# Patient Record
Sex: FEMALE | Race: WHITE | NOT HISPANIC OR LATINO | Employment: UNEMPLOYED | RURAL
[De-identification: names, ages, dates, MRNs, and addresses within clinical notes are randomized per-mention and may not be internally consistent; named-entity substitution may affect disease eponyms.]

---

## 2020-11-25 ENCOUNTER — HISTORICAL (OUTPATIENT)
Dept: ADMINISTRATIVE | Facility: HOSPITAL | Age: 55
End: 2020-11-25

## 2022-02-15 ENCOUNTER — OFFICE VISIT (OUTPATIENT)
Dept: DERMATOLOGY | Facility: CLINIC | Age: 57
End: 2022-02-15

## 2022-02-15 VITALS — BODY MASS INDEX: 34.07 KG/M2 | WEIGHT: 224.81 LBS | RESPIRATION RATE: 18 BRPM | HEIGHT: 68 IN

## 2022-02-15 DIAGNOSIS — L82.1 SEBORRHEIC KERATOSES: ICD-10-CM

## 2022-02-15 DIAGNOSIS — L57.8 OTHER SKIN CHANGES DUE TO CHRONIC EXPOSURE TO NONIONIZING RADIATION: Primary | ICD-10-CM

## 2022-02-15 DIAGNOSIS — D22.9 MULTIPLE BENIGN NEVI: ICD-10-CM

## 2022-02-15 PROCEDURE — 99203 OFFICE O/P NEW LOW 30 MIN: CPT | Mod: ,,, | Performed by: DERMATOLOGY

## 2022-02-15 PROCEDURE — 99203 PR OFFICE/OUTPT VISIT, NEW, LEVL III, 30-44 MIN: ICD-10-PCS | Mod: ,,, | Performed by: DERMATOLOGY

## 2022-02-15 NOTE — PROGRESS NOTES
[facility]   Ria Krishnamurthy MD    Patient Name: Jazmin Ruiz  Patient YOB: 1965   Date of Service: 2/15/22    CC: Full Skin Exam    HPI: Jazmin Ruiz is a 57 y.o. female presents today for a full skin exam.  Patient has not been seen by a dermatologist in the past and dermatologic history includes no hx of skin cancer. Patient is concerned today about a mole located on the groin.  It has been present for 1 year(s). It has not been treated in the past.     Patient is also concerned today about lesion located on the right and left flank. States lesion is new and irregular.    Past Medical History:   Diagnosis Date    HTN (hypertension)     Seizures      Past Surgical History:   Procedure Laterality Date    HYSTERECTOMY       Review of patient's allergies indicates:  No Known Allergies  No current outpatient medications on file.    ROS: A focused review of systems was obtained and negative.     Exam: A full skin exam was performed including scalp, hair, face, neck, chest, back, abdomen, right arm, left arm, right hand, left hand, nails, right leg, and left leg.  All areas examined were normal expect as per below in assessment and plan.  General Appearance of the patient is well developed and well nourished.  Orientation: alert and oriented x 3.  Mood and affect: pleasant.    Assessment:   The primary encounter diagnosis was Other skin changes due to chronic exposure to nonionizing radiation. Diagnoses of Multiple benign nevi and Seborrheic keratoses were also pertinent to this visit.    Plan:  Other Skin Changes Due to Chronic Exposure of Nonionizing Radiation (L57.8)    Plan: Monitoring.     Plan: Sunscreen Recommendations.  I recommended a broad spectrum sunscreen with a SPF of 30 or higher. I explained that SPF 30 sunscreens block approximately 97 percent of the  sun's harmful rays. Sunscreens should be applied at least 15 minutes prior to expected sun exposure and then every  2 hours after that as long as  sun exposure continues. If swimming or exercising sunscreen should be reapplied every 45 minutes to an hour after getting wet or sweating. One  ounce, or the equivalent of a shot glass full of sunscreen, is adequate to protect the skin not covered by a bathing suit. I also recommended a lip  balm with a sunscreen as well. Sun protective clothing can be used in lieu of sunscreen but must be worn the entire time you are exposed to the  sun's rays.    Seborrheic Keratosis (L82.1)  - Stuck-on, warty, greasy brown papule with pseudo-horn cysts scattered on the trunk and extremities    Plan: Counseling.  I counseled the patient regarding the following:  Skin Care: Seborrheic Keratoses are benign. No treatment is necessary.  Expectations: Seborrheic Keratoses are benign warty growths. Patients get more of them as they age    Plan: Reassurance    Benign Nevus (D22.72)  - Dome shaped regular papule    Plan: Reassurance.    Plan: Counseling.  I counseled the patient regarding the following:  Instructions: Monthly self-skin checks to monitor for any changes in moles are recommended.  Expectations: Benign Nevi are pigmented nests of cells within the skin. No treatment is necessary.  Contact Office if: Any moles change in size, shape or color; itch, burn or bleed.            Follow up if symptoms worsen or fail to improve.    Ria Krishnamurthy MD

## 2023-10-18 ENCOUNTER — OFFICE VISIT (OUTPATIENT)
Dept: OBSTETRICS AND GYNECOLOGY | Facility: CLINIC | Age: 58
End: 2023-10-18

## 2023-10-18 ENCOUNTER — HOSPITAL ENCOUNTER (OUTPATIENT)
Dept: RADIOLOGY | Facility: HOSPITAL | Age: 58
Discharge: HOME | End: 2023-10-18
Attending: OBSTETRICS & GYNECOLOGY

## 2023-10-18 VITALS
RESPIRATION RATE: 18 BRPM | BODY MASS INDEX: 35.52 KG/M2 | OXYGEN SATURATION: 99 % | SYSTOLIC BLOOD PRESSURE: 148 MMHG | DIASTOLIC BLOOD PRESSURE: 99 MMHG | TEMPERATURE: 99 F | HEIGHT: 68 IN | WEIGHT: 234.38 LBS | HEART RATE: 100 BPM

## 2023-10-18 DIAGNOSIS — Z87.19 HISTORY OF BLOODY STOOLS: ICD-10-CM

## 2023-10-18 DIAGNOSIS — N94.19 DYSPAREUNIA DUE TO MEDICAL CONDITION IN FEMALE: ICD-10-CM

## 2023-10-18 DIAGNOSIS — Z01.419 ENCOUNTER FOR ANNUAL ROUTINE GYNECOLOGICAL EXAMINATION: ICD-10-CM

## 2023-10-18 DIAGNOSIS — R10.9 ABDOMINAL BLOATING WITH CRAMPS: ICD-10-CM

## 2023-10-18 DIAGNOSIS — N81.6 BADEN-WALKER GRADE 1 RECTOCELE: ICD-10-CM

## 2023-10-18 DIAGNOSIS — E78.5 ELEVATED LIPIDS: ICD-10-CM

## 2023-10-18 DIAGNOSIS — I10 HYPERTENSION, UNSPECIFIED TYPE: ICD-10-CM

## 2023-10-18 DIAGNOSIS — M54.50 LOW BACK PAIN WITHOUT SCIATICA, UNSPECIFIED BACK PAIN LATERALITY, UNSPECIFIED CHRONICITY: ICD-10-CM

## 2023-10-18 DIAGNOSIS — M25.551 RIGHT HIP PAIN: ICD-10-CM

## 2023-10-18 DIAGNOSIS — E55.9 VITAMIN D DEFICIENCY: ICD-10-CM

## 2023-10-18 DIAGNOSIS — M85.80 OSTEOPENIA, UNSPECIFIED LOCATION: ICD-10-CM

## 2023-10-18 DIAGNOSIS — N25.9 IMPAIRED RENAL FUNCTION DISORDER: ICD-10-CM

## 2023-10-18 DIAGNOSIS — E66.9 OBESITY (BMI 35.0-39.9 WITHOUT COMORBIDITY): ICD-10-CM

## 2023-10-18 DIAGNOSIS — R14.0 ABDOMINAL BLOATING WITH CRAMPS: ICD-10-CM

## 2023-10-18 DIAGNOSIS — M81.0 OSTEOPOROSIS, UNSPECIFIED OSTEOPOROSIS TYPE, UNSPECIFIED PATHOLOGICAL FRACTURE PRESENCE: ICD-10-CM

## 2023-10-18 DIAGNOSIS — R77.8 ELEVATED TOTAL PROTEIN: ICD-10-CM

## 2023-10-18 DIAGNOSIS — Z78.0 MENOPAUSE: Primary | ICD-10-CM

## 2023-10-18 DIAGNOSIS — N30.90 CYSTITIS: ICD-10-CM

## 2023-10-18 DIAGNOSIS — N95.2 VAGINITIS, ATROPHIC: ICD-10-CM

## 2023-10-18 LAB
BACTERIA #/AREA URNS HPF: ABNORMAL /HPF
BILIRUB UR QL STRIP: NEGATIVE
CLARITY UR: ABNORMAL
COLOR UR: YELLOW
CTP QC/QA: YES
FECAL OCCULT BLOOD, POC: NEGATIVE
GLUCOSE UR STRIP-MCNC: NORMAL MG/DL
HYALINE CASTS #/AREA URNS LPF: ABNORMAL /LPF
KETONES UR STRIP-SCNC: NEGATIVE MG/DL
LEUKOCYTE ESTERASE UR QL STRIP: ABNORMAL
MUCOUS, UA: ABNORMAL /LPF
NITRITE UR QL STRIP: POSITIVE
PH UR STRIP: 6 PH UNITS
PROT UR QL STRIP: 10
RBC # UR STRIP: NEGATIVE /UL
RBC #/AREA URNS HPF: 2 /HPF
SP GR UR STRIP: 1.02
SQUAMOUS #/AREA URNS LPF: ABNORMAL /HPF
UROBILINOGEN UR STRIP-ACNC: NORMAL MG/DL
WBC #/AREA URNS HPF: 5 /HPF

## 2023-10-18 PROCEDURE — 82270 OCCULT BLOOD FECES: CPT | Mod: PBBFAC,91 | Performed by: OBSTETRICS & GYNECOLOGY

## 2023-10-18 PROCEDURE — 87077 CULTURE, URINE: ICD-10-PCS | Mod: ,,, | Performed by: CLINICAL MEDICAL LABORATORY

## 2023-10-18 PROCEDURE — 87624 HUMAN PAPILLOMAVIRUS (HPV): ICD-10-PCS | Mod: ,,, | Performed by: CLINICAL MEDICAL LABORATORY

## 2023-10-18 PROCEDURE — 99999PBSHW POCT OCCULT BLOOD STOOL: Mod: PBBFAC,,,

## 2023-10-18 PROCEDURE — 87186 CULTURE, URINE: ICD-10-PCS | Mod: ,,, | Performed by: CLINICAL MEDICAL LABORATORY

## 2023-10-18 PROCEDURE — 87186 SC STD MICRODIL/AGAR DIL: CPT | Mod: ,,, | Performed by: CLINICAL MEDICAL LABORATORY

## 2023-10-18 PROCEDURE — 99999PBSHW POCT OCCULT BLOOD STOOL: ICD-10-PCS | Mod: PBBFAC,,,

## 2023-10-18 PROCEDURE — 72100 XR LUMBAR SPINE AP AND LATERAL: ICD-10-PCS | Mod: 26,,, | Performed by: RADIOLOGY

## 2023-10-18 PROCEDURE — 99999PBSHW PR PBB SHADOW TECHNICAL ONLY FILED TO HB: Mod: PBBFAC,,,

## 2023-10-18 PROCEDURE — 99215 OFFICE O/P EST HI 40 MIN: CPT | Mod: PBBFAC | Performed by: OBSTETRICS & GYNECOLOGY

## 2023-10-18 PROCEDURE — 87086 URINE CULTURE/COLONY COUNT: CPT | Mod: ,,, | Performed by: CLINICAL MEDICAL LABORATORY

## 2023-10-18 PROCEDURE — 99396 PR PREVENTIVE VISIT,EST,40-64: ICD-10-PCS | Mod: S$PBB,,, | Performed by: OBSTETRICS & GYNECOLOGY

## 2023-10-18 PROCEDURE — 81001 URINALYSIS: ICD-10-PCS | Mod: ,,, | Performed by: CLINICAL MEDICAL LABORATORY

## 2023-10-18 PROCEDURE — 82270 OCCULT BLOOD FECES: CPT | Mod: PBBFAC | Performed by: OBSTETRICS & GYNECOLOGY

## 2023-10-18 PROCEDURE — 99396 PREV VISIT EST AGE 40-64: CPT | Mod: S$PBB,,, | Performed by: OBSTETRICS & GYNECOLOGY

## 2023-10-18 PROCEDURE — 73502 X-RAY EXAM HIP UNI 2-3 VIEWS: CPT | Mod: TC,RT

## 2023-10-18 PROCEDURE — 72100 X-RAY EXAM L-S SPINE 2/3 VWS: CPT | Mod: 26,,, | Performed by: RADIOLOGY

## 2023-10-18 PROCEDURE — 87624 HPV HI-RISK TYP POOLED RSLT: CPT | Mod: ,,, | Performed by: CLINICAL MEDICAL LABORATORY

## 2023-10-18 PROCEDURE — 87077 CULTURE AEROBIC IDENTIFY: CPT | Mod: ,,, | Performed by: CLINICAL MEDICAL LABORATORY

## 2023-10-18 PROCEDURE — 87086 CULTURE, URINE: ICD-10-PCS | Mod: ,,, | Performed by: CLINICAL MEDICAL LABORATORY

## 2023-10-18 PROCEDURE — 72100 X-RAY EXAM L-S SPINE 2/3 VWS: CPT | Mod: TC

## 2023-10-18 PROCEDURE — 73502 XR HIP WITH PELVIS WHEN PERFORMED, 2 OR 3  VIEWS RIGHT: ICD-10-PCS | Mod: 26,RT,, | Performed by: RADIOLOGY

## 2023-10-18 PROCEDURE — 87591 N.GONORRHOEAE DNA AMP PROB: CPT | Mod: ,,, | Performed by: CLINICAL MEDICAL LABORATORY

## 2023-10-18 PROCEDURE — 88142 CYTOPATH C/V THIN LAYER: CPT | Mod: TC,GCY | Performed by: OBSTETRICS & GYNECOLOGY

## 2023-10-18 PROCEDURE — 81001 URINALYSIS AUTO W/SCOPE: CPT | Mod: ,,, | Performed by: CLINICAL MEDICAL LABORATORY

## 2023-10-18 PROCEDURE — 87491 CHLAMYDIA/GC, PCR (THINPREP): ICD-10-PCS | Mod: ,,, | Performed by: CLINICAL MEDICAL LABORATORY

## 2023-10-18 PROCEDURE — 73502 X-RAY EXAM HIP UNI 2-3 VIEWS: CPT | Mod: 26,RT,, | Performed by: RADIOLOGY

## 2023-10-18 PROCEDURE — 87591 CHLAMYDIA/GC, PCR (THINPREP): ICD-10-PCS | Mod: ,,, | Performed by: CLINICAL MEDICAL LABORATORY

## 2023-10-18 PROCEDURE — 87491 CHLMYD TRACH DNA AMP PROBE: CPT | Mod: ,,, | Performed by: CLINICAL MEDICAL LABORATORY

## 2023-10-18 RX ORDER — ESTRADIOL 0.1 MG/G
1 CREAM VAGINAL
Qty: 42.5 G | Refills: 1 | Status: SHIPPED | OUTPATIENT
Start: 2023-10-19 | End: 2024-10-18

## 2023-10-18 RX ORDER — ESTRADIOL 0.1 MG/D
1 PATCH TRANSDERMAL
Qty: 12 PATCH | Refills: 3 | Status: SHIPPED | OUTPATIENT
Start: 2023-10-18 | End: 2023-11-15

## 2023-10-18 NOTE — PATIENT INSTRUCTIONS
Dr. Hernan Arshad thanks you for this office visit at [facility].    Diagnosis for this visit:   Problem List Items Addressed This Visit    None  Visit Diagnoses       History of bloody stools    -  Primary    Relevant Orders    Ambulatory referral/consult to Gastroenterology    Vaginitis and vulvovaginitis        Encounter for annual routine gynecological examination        Hypertension, unspecified type                 The Plan: Screening labs; vitamin-D check; hemoglobin A1c; gonadotropins and estradiol level; thin prep Pap; hemoccult screen was negative today             Discuss rectocele             Referral to GI Medicine             Dr. Arshad recommends transdermal estrogen and also recommends estradiol vaginal cream to treat the symptoms associated atrophic vaginitis as well as burning or dysuria with urination when the urine hits the outside skin around the vagina.            Dr. Arshad does recommend a bone density study since she is been on no estrogen replacement therapy and he does also recommend a thoracic and lumbar x-rays as well as right hip x-ray.            Dr. Arshad recommends vitamin D3 over-the-counter at 4000 units daily.      Please practice best food and exercise habits for your age.    Dr. Hernan Arshad recommends avoidance of smoking and illicit medications or habits.    Please call  or schedule for any change in your health.     Please keep the next scheduled appointment or call for any need to change the appointment.     Dr. Hernan Arshad recommends yearly exams for good health maintenance.      Thank you    Dr. Hernan Arshad  10/18/2023 12:16 PM

## 2023-10-18 NOTE — PROGRESS NOTES
Jazmin Ruiz female  for   Chief Complaint   Patient presents with    Annual Exam     As per lala she is here for an annual exam and stated that she is having blood in her stool,urine and in her vagina as well as well as a strong odor in her stool and mucus as well. But she denied having any UTI's and is bloated as well. Eating is difficult as she has fullness after a few bites.       OB History          5    Para   3    Term                AB   2    Living             SAB        IAB        Ectopic        Multiple        Live Births                      PHI: Patient presents to our office for an annual exam with several related problems.  The patient does not use estrogen replacement therapy but she has had in the past a abdominal hysterectomy with BSO.  Patient is not sexually active due to severe dyspareunia secondary to dryness.     Patient relates she has to splint for bowel movements.  Also relates that with eating habits she has epigastric and abdominal pain.  She also has change in her bowel habits with more mucus and blood in her stools.  She does complain of intermittent hematuria.  She is complain also some vaginal bleeding.     Patient does complain of occasional clear discharge from both nipples.     Patient does relate she has chronic low back pain in pain when she ambulates that limits her ability to walk.  She has right hip pain as well.     History reviewed. No pertinent past medical history.         Past Surgical History:   Procedure Laterality Date    TOTAL ABDOMINAL HYSTERECTOMY W/ BILATERAL SALPINGOOPHORECTOMY         Dr. Mark Medina        Review of patient's allergies indicates:  No Known Allergies      ROS:Pertinent items are noted in HPI.     Physical exam:     Blood pressure:  148/99; height 5 ft 8 in; weight:  234 lb in 6.4 oz; pulse rate: 100     General Appearance: healthy, alert, no distress, smiling     HEENT: normal exam     Lymphatic: no palpable  lymphadenopathy, no hepatosplenomegaly     Chest:         Breasts:No dimpling, nipple retraction or discharge. No masses or nodes., Normal to inspection, and Normal breast tissue bilaterally; left nipple is partially inverted         Lungs: clear to auscultation bilaterally and normal percussion bilaterally         Heart: regular rate and rhythm, S1, S2 normal, no murmur, click, rub or gallop     Abdomen:soft, non-tender, without masses or organomegaly, normal bowel sounds, without guarding, without rebound, and hypogastric midline incision without any evidence of ventral incisional hernia; patient has no ascites and there is no evidence of any abdominal bruit.  There is a moderate abdominal panniculus.     Pelvic:             Vulva:Bartholin's, Urethra, [place] normal, no lesions, normal female hair distribution, no clitoral enlargement, vulva unremarkable except there is contracture the vaginal introitus with 2 fingers  noted laterally on exam does produce discomfort.            Vagina:  There is no cystocele, no urethrocele no vault prolapse but there is a rectocele present ;vaginal walls are tender.            Uterus: surgically absent uterus and cervix            Adnexae: surgically absent     Rectal:  Exam does confirm the rectocele present; there is no blood noted; on examining the rectum stool guaiac negative     Extremity: normal, extremities warm, no clubbing, no cyanosis, no edema, non-tender; there is no CVA tenderness     Skin: normal exam           Assessment:   Problem List Items Addressed This Visit    None  Visit Diagnoses         Menopause syndrome    -  Primary     Relevant Orders     ThinPrep Pap Test     CBC Auto Differential     Comprehensive Metabolic Panel     Vitamin D     Thyroid Panel     Urinalysis     Sedimentation Rate     Hemoglobin A1C     Urine culture     [place]ovis-Walker grade 1 rectocele         Relevant Orders     ThinPrep Pap Test     CBC Auto Differential     Comprehensive Metabolic  Panel     Vitamin D     Thyroid Panel     Urinalysis     Sedimentation Rate     Hemoglobin A1C     Urine culture     History of bloody stools         Relevant Orders     ThinPrep Pap Test     CBC Auto Differential     Comprehensive Metabolic Panel     Vitamin D     Thyroid Panel     Urinalysis     Sedimentation Rate     Hemoglobin A1C     Urine culture     Dyspareunia due to medical condition in female         Relevant Orders     ThinPrep Pap Test     CBC Auto Differential     Comprehensive Metabolic Panel     Vitamin D     Thyroid Panel     Urinalysis     Sedimentation Rate     Hemoglobin A1C     Urine culture     Encounter for annual routine gynecological examination         Relevant Orders     ThinPrep Pap Test     CBC Auto Differential     Comprehensive Metabolic Panel     Vitamin D     Thyroid Panel     Urinalysis     Sedimentation Rate     Hemoglobin A1C     Urine culture     Vitamin D deficiency         Relevant Orders     ThinPrep Pap Test     CBC Auto Differential     Comprehensive Metabolic Panel     Vitamin D     Thyroid Panel     Urinalysis     Sedimentation Rate     Hemoglobin A1C     Urine culture     Osteopenia, unspecified location         Relevant Orders     ThinPrep Pap Test     CBC Auto Differential     Comprehensive Metabolic Panel     Vitamin D     Thyroid Panel     Urinalysis     Sedimentation Rate     Hemoglobin A1C     Urine culture     Low back pain without sciatica, unspecified back pain laterality, unspecified chronicity         Relevant Orders     ThinPrep Pap Test     CBC Auto Differential     Comprehensive Metabolic Panel     Vitamin D     Thyroid Panel     Urinalysis     Sedimentation Rate     Hemoglobin A1C     Urine culture       Hypertension         Plan:  Screening labs; vitamin-D check; hemoglobin A1c; gonadotropins and estradiol level; thin prep Pap; hemoccult screen was negative today             Discuss rectocele             Referral to GI Medicine             Dr. Arshad  recommends transdermal estrogen and also recommends estradiol vaginal cream to treat the symptoms associated atrophic vaginitis as well as burning or dysuria with urination when the urine hits the outside skin around the vagina.            Dr. Arshad does recommend a bone density study since she is been on no estrogen replacement therapy and he does also recommend a thoracic and lumbar x-rays as well as right hip x-ray.            Dr. Arshad recommends vitamin D3 over-the-counter at 4000 units daily.              Addendum on 10/19/2023:  Patient was found have elevated cholesterol and lipids.  Please refer to lab for detail; patient's vitamin-D level is low at 18 ng/mL.  Patient does have an elevated gammaglobulin as well as total proteins.  She does need a protein electrophoresis.  She will need a repeat renal function study in the future.             Addendum on 10/20/2023:  Patient does have greater than 100,000 colony count of E coli.  Macrobid is an appropriate antibiotic and this will be sent to her pharmacy.           Addendum on 01/01/2023:  DEXA study indicates osteoporosis-relatively severe.  Patient is a candidate for Prolia.    DEXA report:  EXAMINATION:  Bone density study     CLINICAL HISTORY:  Other specified disorders of bone density and structure, unspecified site     TECHNIQUE:  Bone densitometry performed with evaluation of the lumbar spine and left hip.     COMPARISON:  None     FINDINGS:  L2-L4:     BMD (g/cm2): 0.844     T score: -2.7     Z score: -1.3     Bone mineral density: Osteoporosis     Left total femur:     BMD (g/cm2): 0.653     T score: -2.4     Z score: -1.5     Bone mineral density: Osteopenia     Left femoral neck:     BMD (g/cm2): 0.543     T score: -2.8     Z score: -1.5     Bone mineral density: Osteoporosis

## 2023-10-19 PROBLEM — R77.8 ELEVATED TOTAL PROTEIN: Status: ACTIVE | Noted: 2023-10-19

## 2023-10-19 PROBLEM — N25.9 IMPAIRED RENAL FUNCTION DISORDER: Status: ACTIVE | Noted: 2023-10-19

## 2023-10-20 LAB
GH SERPL-MCNC: NORMAL NG/ML
INSULIN SERPL-ACNC: NORMAL U[IU]/ML
LAB AP CLINICAL INFORMATION: NORMAL
LAB AP GYN INTERPRETATION: NEGATIVE
LAB AP PAP DISCLAIMER COMMENTS: NORMAL
RENIN PLAS-CCNC: NORMAL NG/ML/H
UA COMPLETE W REFLEX CULTURE PNL UR: ABNORMAL

## 2023-10-20 RX ORDER — NITROFURANTOIN 25; 75 MG/1; MG/1
100 CAPSULE ORAL 2 TIMES DAILY
Qty: 20 CAPSULE | Refills: 0 | Status: SHIPPED | OUTPATIENT
Start: 2023-10-20 | End: 2023-10-30

## 2023-10-24 LAB
CHLAMYDIA BY PCR: NEGATIVE
HPV 16: NEGATIVE
HPV 18: NEGATIVE
HPV OTHER: NEGATIVE
N. GONORRHOEAE (GC) BY PCR: NEGATIVE

## 2023-11-01 ENCOUNTER — HOSPITAL ENCOUNTER (OUTPATIENT)
Dept: RADIOLOGY | Facility: HOSPITAL | Age: 58
Discharge: HOME | End: 2023-11-01
Attending: OBSTETRICS & GYNECOLOGY

## 2023-11-01 DIAGNOSIS — M85.80 OSTEOPENIA, UNSPECIFIED LOCATION: ICD-10-CM

## 2023-11-01 PROCEDURE — 77080 DXA BONE DENSITY AXIAL: CPT | Mod: TC

## 2023-11-01 PROCEDURE — 77080 DXA BONE DENSITY AXIAL SKELETON 1 OR MORE SITES: ICD-10-PCS | Mod: 26,,, | Performed by: RADIOLOGY

## 2023-11-01 PROCEDURE — 77080 DXA BONE DENSITY AXIAL: CPT | Mod: 26,,, | Performed by: RADIOLOGY

## 2023-11-15 ENCOUNTER — PATIENT MESSAGE (OUTPATIENT)
Dept: OBSTETRICS AND GYNECOLOGY | Facility: CLINIC | Age: 58
End: 2023-11-15

## 2023-11-15 ENCOUNTER — OFFICE VISIT (OUTPATIENT)
Dept: OBSTETRICS AND GYNECOLOGY | Facility: CLINIC | Age: 58
End: 2023-11-15

## 2023-11-15 VITALS
BODY MASS INDEX: 36.07 KG/M2 | SYSTOLIC BLOOD PRESSURE: 145 MMHG | RESPIRATION RATE: 18 BRPM | HEART RATE: 90 BPM | OXYGEN SATURATION: 97 % | HEIGHT: 68 IN | DIASTOLIC BLOOD PRESSURE: 78 MMHG | WEIGHT: 238 LBS

## 2023-11-15 DIAGNOSIS — E89.41 HOT FLASHES DUE TO SURGICAL MENOPAUSE: ICD-10-CM

## 2023-11-15 DIAGNOSIS — N25.9 IMPAIRED RENAL FUNCTION DISORDER: ICD-10-CM

## 2023-11-15 DIAGNOSIS — M54.9 OTHER CHRONIC BACK PAIN: ICD-10-CM

## 2023-11-15 DIAGNOSIS — Z12.31 SCREENING MAMMOGRAM, ENCOUNTER FOR: ICD-10-CM

## 2023-11-15 DIAGNOSIS — G89.29 OTHER CHRONIC BACK PAIN: ICD-10-CM

## 2023-11-15 DIAGNOSIS — M81.6 LOCALIZED OSTEOPOROSIS WITHOUT CURRENT PATHOLOGICAL FRACTURE: ICD-10-CM

## 2023-11-15 DIAGNOSIS — R77.8 ELEVATED TOTAL PROTEIN: Primary | ICD-10-CM

## 2023-11-15 DIAGNOSIS — E66.9 OBESITY (BMI 35.0-39.9 WITHOUT COMORBIDITY): ICD-10-CM

## 2023-11-15 DIAGNOSIS — M47.9 DEGENERATIVE JOINT DISEASE OF LOW BACK: ICD-10-CM

## 2023-11-15 LAB
BILIRUB UR QL STRIP: NEGATIVE
CLARITY UR: CLEAR
COLOR UR: ABNORMAL
GLUCOSE UR STRIP-MCNC: NORMAL MG/DL
KETONES UR STRIP-SCNC: NEGATIVE MG/DL
LEUKOCYTE ESTERASE UR QL STRIP: NEGATIVE
NITRITE UR QL STRIP: NEGATIVE
PH UR STRIP: 5.5 PH UNITS
PROT UR QL STRIP: NEGATIVE
RBC # UR STRIP: ABNORMAL /UL
RBC #/AREA URNS HPF: 1 /HPF
SP GR UR STRIP: 1.02
SQUAMOUS #/AREA URNS LPF: ABNORMAL /HPF
UROBILINOGEN UR STRIP-ACNC: NORMAL MG/DL
WBC #/AREA URNS HPF: <1 /HPF

## 2023-11-15 PROCEDURE — 81001 URINALYSIS: ICD-10-PCS | Mod: ,,, | Performed by: CLINICAL MEDICAL LABORATORY

## 2023-11-15 PROCEDURE — 87086 URINE CULTURE/COLONY COUNT: CPT | Mod: ,,, | Performed by: CLINICAL MEDICAL LABORATORY

## 2023-11-15 PROCEDURE — 81001 URINALYSIS AUTO W/SCOPE: CPT | Mod: ,,, | Performed by: CLINICAL MEDICAL LABORATORY

## 2023-11-15 PROCEDURE — 99214 OFFICE O/P EST MOD 30 MIN: CPT | Mod: PBBFAC | Performed by: OBSTETRICS & GYNECOLOGY

## 2023-11-15 PROCEDURE — 99214 OFFICE O/P EST MOD 30 MIN: CPT | Mod: S$PBB,,, | Performed by: OBSTETRICS & GYNECOLOGY

## 2023-11-15 PROCEDURE — 87086 CULTURE, URINE: ICD-10-PCS | Mod: ,,, | Performed by: CLINICAL MEDICAL LABORATORY

## 2023-11-15 PROCEDURE — 99214 PR OFFICE/OUTPT VISIT, EST, LEVL IV, 30-39 MIN: ICD-10-PCS | Mod: S$PBB,,, | Performed by: OBSTETRICS & GYNECOLOGY

## 2023-11-15 RX ORDER — ESTRADIOL 1 MG/1
1 TABLET ORAL DAILY
Qty: 30 TABLET | Refills: 11 | Status: SHIPPED | OUTPATIENT
Start: 2023-11-15 | End: 2024-11-14

## 2023-11-15 RX ORDER — ALENDRONATE SODIUM 70 MG/1
70 TABLET ORAL
Qty: 4 TABLET | Refills: 11 | Status: SHIPPED | OUTPATIENT
Start: 2023-11-15 | End: 2024-11-14

## 2023-11-15 NOTE — PATIENT INSTRUCTIONS
Dr. Hernan Arshad thanks you for this office visit at [facility].    Diagnosis for this visit:   Problem List Items Addressed This Visit          Renal/    Impaired renal function disorder       Other    Elevated total protein     Other Visit Diagnoses       Hot flashes due to surgical menopause    -  Primary    Other chronic back pain        Degenerative joint disease of low back        Localized osteoporosis without current pathological fracture        Obesity (BMI 35.0-39.9 without comorbidity)                 The Plan: :  Alendronate to treat osteoporosis since from economic standpoint Prolia is not available; switch to estradiol 1 mg daily and discontinue transdermal estrogen at cause skin irritation; use topical vaginal estrogen to help treat atrophic vaginitis            Referral to spinal orthopedic surgery for evaluation of her chronic back pain and recommendation for physical therapy; recommend referral to weight loss center for weight loss and diet control; physical therapy for back strengthening          Referral GI Medicine regards elevated alkaline phosphatase and hepatic steatosis as well as change in bowel habits         Repeat renal function studies in about 3 months.  She may need referral to nephrology after that study.         Return to see Dr. Arshad in 3 months' time         Urinalysis and urine culture today        Please practice best food and exercise habits for your age.    Dr. Hernan Arhsad recommends avoidance of smoking and illicit medications or habits.    Please call  or schedule for any change in your health.     Please keep the next scheduled appointment or call for any need to change the appointment.     Dr. Hernan Arshad recommends yearly exams for good health maintenance.      Thank you    Dr. Hernan Arshad  11/15/2023 2:00 PM

## 2023-11-15 NOTE — PROGRESS NOTES
"Jazmin Ruiz female  for   Chief Complaint   Patient presents with    Follow-up     Patient is here for a 4 week follow up , patient did state she had some trouble with the Estrace patches, they caused an allergic reaction and patient did not end up using the cream either           PHI:  Patient returns for follow-up in view of labs.  Her labs were reviewed.  The patient has normal hemoglobin A1c.  She does have an elevated alkaline phosphatase.  Chart review indicates that in 2020 she did have a abdominal ultrasound that revealed hepatic steatosis.  Patient's other labs are within normal limits a decreased EGFR and an increased creatinine.            Patient did use the Macrobid for the cystitis    Patient states she can not tolerate transdermal estrogen-the skin was irritated with the use the transdermal estrogen.  As a consequence she was afraid to use a topical vaginal estrogen.    DEXA scan has revealed that she has significant osteoporosis of both the lumbar spine and left femoral neck and a T-score of-2.8.  Back x-rays revealed degenerative widespread disease with some disc loss.  She states she has significant back pain and can not walk any significant distance because of the low back pain.    Patient was found have a rectocele.    Past Medical History:   Diagnosis Date    HTN (hypertension)     Seizures       Past Surgical History:   Procedure Laterality Date    HYSTERECTOMY        Review of patient's allergies indicates:  No Known Allergies     ROS:Pertinent items are noted in HPI.    Physical exam:    BP (!) 145/78 (BP Location: Left arm, Patient Position: Sitting)   Pulse 90   Resp 18   Ht 5' 8" (1.727 m)   Wt 108 kg (238 lb)   LMP  (LMP Unknown)   SpO2 97%   BMI 36.19 kg/m²      General Appearance: alert, smiling    Chest:           Lungs: clear to auscultation bilaterally           Heart: regular rate and rhythm, S1, S2 normal, no murmur, click, rub or gallop    Abdomen:not " performed    Pelvic: not performed     Extremity: normal    Skin: normal exam        Assessment:   Problem List Items Addressed This Visit    None       Plan:  Alendronate to treat osteoporosis since from economic standpoint Prolia is not available; switch to estradiol 1 mg daily and discontinue transdermal estrogen at cause skin irritation; use topical vaginal estrogen to help treat atrophic vaginitis            Referral to spinal orthopedic surgery for evaluation of her chronic back pain and recommendation for physical therapy; recommend referral to weight loss center for weight loss and diet control; physical therapy for back strengthening          Referral GI Medicine regards elevated alkaline phosphatase and hepatic steatosis as well as change in bowel habits         Repeat renal function studies in about 3 months.  She may need referral to nephrology after that study.          Scheduled for mammogram         Return to see Dr. Arshad in 3 months' time         Urinalysis and urine culture today

## 2023-11-16 ENCOUNTER — PATIENT MESSAGE (OUTPATIENT)
Dept: OBSTETRICS AND GYNECOLOGY | Facility: CLINIC | Age: 58
End: 2023-11-16

## 2023-11-17 LAB — UA COMPLETE W REFLEX CULTURE PNL UR: NORMAL

## 2023-11-28 ENCOUNTER — TELEPHONE (OUTPATIENT)
Dept: OBSTETRICS AND GYNECOLOGY | Facility: CLINIC | Age: 58
End: 2023-11-28

## 2023-11-28 NOTE — TELEPHONE ENCOUNTER
----- Message from Hernan Arshad MD sent at 11/1/2023  2:56 PM CDT -----  Regarding: Osteoporosis  This patient definitely needs be qualified for      Pt does not have any insurance coverage at this time.

## 2023-12-01 ENCOUNTER — HOSPITAL ENCOUNTER (OUTPATIENT)
Dept: RADIOLOGY | Facility: HOSPITAL | Age: 58
Discharge: HOME | End: 2023-12-01
Attending: OBSTETRICS & GYNECOLOGY

## 2023-12-01 ENCOUNTER — HOSPITAL ENCOUNTER (OUTPATIENT)
Dept: RADIOLOGY | Facility: HOSPITAL | Age: 58
Discharge: HOME | End: 2023-12-01
Attending: RADIOLOGY

## 2023-12-01 DIAGNOSIS — N64.4 BREAST PAIN: ICD-10-CM

## 2023-12-01 DIAGNOSIS — N64.52 BILATERAL NIPPLE DISCHARGE: ICD-10-CM

## 2023-12-01 PROCEDURE — 77066 DX MAMMO INCL CAD BI: CPT | Mod: TC

## 2023-12-01 PROCEDURE — 76641 US BREAST BILATERAL COMPLETE: ICD-10-PCS | Mod: 26,50,, | Performed by: RADIOLOGY

## 2023-12-01 PROCEDURE — 76641 ULTRASOUND BREAST COMPLETE: CPT | Mod: TC,50

## 2023-12-01 PROCEDURE — 76641 ULTRASOUND BREAST COMPLETE: CPT | Mod: 26,50,, | Performed by: RADIOLOGY

## 2023-12-01 PROCEDURE — 77066 MAMMO DIGITAL DIAGNOSTIC BILAT: ICD-10-PCS | Mod: 26,,, | Performed by: RADIOLOGY

## 2023-12-01 PROCEDURE — 77066 DX MAMMO INCL CAD BI: CPT | Mod: 26,,, | Performed by: RADIOLOGY

## 2023-12-07 ENCOUNTER — TELEPHONE (OUTPATIENT)
Dept: OBSTETRICS AND GYNECOLOGY | Facility: CLINIC | Age: 58
End: 2023-12-07

## 2023-12-07 NOTE — TELEPHONE ENCOUNTER
----- Message from Gilma Dale sent at 12/7/2023 10:38 AM CST -----  Regarding: RETURN CALL  PATIENT CALL AND WOULD LIKE A CALL BACK, CALL BACK NUMBER [phone]

## 2023-12-18 ENCOUNTER — TELEPHONE (OUTPATIENT)
Dept: OBSTETRICS AND GYNECOLOGY | Facility: CLINIC | Age: 58
End: 2023-12-18

## 2023-12-18 NOTE — TELEPHONE ENCOUNTER
----- Message from Anusha Urrutia sent at 12/15/2023 10:57 AM CST -----  Pt is checking on prev message about 2 referrals. She is in pain with hip and needs this done. She said you must call this number [phone], not her number that's listed. Her phone is weird about letting calls come through.     There have been previous messages on this patient,  is aware of the referrals and is currently working on getting them done. If they have been made the specialist is in charge of reaching out to the patient for further assistance on making appts.

## 2024-01-03 ENCOUNTER — OFFICE VISIT (OUTPATIENT)
Dept: SPINE | Facility: CLINIC | Age: 59
End: 2024-01-03

## 2024-01-03 ENCOUNTER — HOSPITAL ENCOUNTER (OUTPATIENT)
Dept: RADIOLOGY | Facility: HOSPITAL | Age: 59
Discharge: HOME | End: 2024-01-03
Attending: NURSE PRACTITIONER

## 2024-01-03 DIAGNOSIS — Z00.00 ROUTINE GENERAL MEDICAL EXAMINATION AT HEALTH CARE FACILITY: ICD-10-CM

## 2024-01-03 DIAGNOSIS — M54.16 LUMBAR RADICULOPATHY: Primary | ICD-10-CM

## 2024-01-03 DIAGNOSIS — M54.9 OTHER CHRONIC BACK PAIN: ICD-10-CM

## 2024-01-03 DIAGNOSIS — G89.29 OTHER CHRONIC BACK PAIN: ICD-10-CM

## 2024-01-03 DIAGNOSIS — M47.9 DEGENERATIVE JOINT DISEASE OF LOW BACK: ICD-10-CM

## 2024-01-03 DIAGNOSIS — M54.16 LUMBAR RADICULOPATHY: ICD-10-CM

## 2024-01-03 DIAGNOSIS — M81.6 LOCALIZED OSTEOPOROSIS WITHOUT CURRENT PATHOLOGICAL FRACTURE: ICD-10-CM

## 2024-01-03 PROCEDURE — 72110 X-RAY EXAM L-2 SPINE 4/>VWS: CPT | Mod: 26,,, | Performed by: RADIOLOGY

## 2024-01-03 PROCEDURE — 99214 OFFICE O/P EST MOD 30 MIN: CPT | Mod: PBBFAC | Performed by: NURSE PRACTITIONER

## 2024-01-03 PROCEDURE — 72110 X-RAY EXAM L-2 SPINE 4/>VWS: CPT | Mod: TC

## 2024-01-03 PROCEDURE — 99204 OFFICE O/P NEW MOD 45 MIN: CPT | Mod: S$PBB,,, | Performed by: NURSE PRACTITIONER

## 2024-01-03 RX ORDER — GABAPENTIN 300 MG/1
300 CAPSULE ORAL 3 TIMES DAILY
Qty: 90 CAPSULE | Refills: 11 | Status: SHIPPED | OUTPATIENT
Start: 2024-01-03 | End: 2025-01-02

## 2024-01-03 NOTE — PROGRESS NOTES
MDM/time:  Greater than 45 minutes spent on this encounter including 15 minutes reviewing imaging and notes, 20 minutes with the patient, 10 minutes documentation    ASSESSMENT:  58 y.o. female with lumbar spondylosis with radiculopathy.    PLAN:  Physical therapy lumbar spine  Referral to Dr. Monae to establish care for internal medicine issues  Neurontin 300 mg 1 tablet 3 times a day  Follow-up in 2 months if no improvement of pain consider MRI lumbar spine    HPI:  58 y.o. female here for evaluation of lower back pain that radiates into the right hip down the right thigh.  Patient reports she has had a history of back pain for the past 10 years or so.  She reports over the past 3 years she has had multiple falls with increasing back pain and some right hip pain.  Patient reports she had an MRI back in November 2020 but did not have any follow-up after that test.  She reports increased pain with standing or walking.  No difficulty with  strength.  Issues with balance and multiple falls.  She denies bladder bowel incontinence but has had blood in her urine and bowels.  She has an appointment tomorrow with GI for evaluation.  Currently takes Tylenol as needed for pain.  No recent physical therapy.  No prior pain management.  No recent MRI.  No prior spine surgery.  Patient is not a smoker.  Patient states she was told in the past she had 1 episode of seizures has not had any issues with seizures since that 1 episode.      IMAGING:  X-Ray Lumbar 4-5 View including Bending Views  Narrative: EXAMINATION:  XR LUMBAR SPINE 4-5 VIEW WITH BENDING VIEWS    CLINICAL HISTORY:  .  Radiculopathy, lumbar region    COMPARISON:  October 18, 2023    TECHNIQUE:  Lumbar spine AP and lateral with lateral flexion and extension views    FINDINGS:  There is no fracture.  There is mild gentle sloping lumbar levoscoliosis centered at L2-L3.  There is no spondylolisthesis.  There is no evidence of instability with flexion and  extension.    There is scattered mild lumbar spondylosis.  There is mild degenerative disc narrowing at L1-L2 and L2-L3 as before.  There is mild facet hypertrophy in the mid to lower lumbar spine.  Impression: Degenerative changes of the spine as before    No lumbar instability with flexion and extension    Electronically signed by: Felix Toledo  Date:    01/03/2024  Time:    15:30       Past Medical History:   Diagnosis Date    HTN (hypertension)     Seizures      Past Surgical History:   Procedure Laterality Date    HYSTERECTOMY       Social History     Tobacco Use    Smoking status: Never     Passive exposure: Never    Smokeless tobacco: Never   Substance Use Topics    Alcohol use: Yes    Drug use: Never      Current Outpatient Medications   Medication Instructions    alendronate (FOSAMAX) 70 mg, Oral, Every 7 days    estradioL (ESTRACE) 1 g, Vaginal, Twice weekly    estradioL (ESTRACE) 1 mg, Oral, Daily        EXAM:  Constitutional  General Appearance:  Overweight, NAD  Psychiatric   Orientation: Oriented to time, oriented to place, oriented to person  Mood and Affect: Active and alert, normal mood, normal affect  Gait and Station   Appearance:  Antalgic gait to the right, unable to tandem gait, able to walk on toes, able to walk on heels    LUMBAR  Musculoskeletal System   Hips: Normal appearance, no leg length discrepancy, normal motion; left, normal motion; right    Lumbar Spine                   Inspection:  Normal alignment, normal sagittal balance                  Range of motion:  Decreased flexion, extension, lateral bending, rotation. Pain with range of motion                  Bony Palpation of the Lumbar Spine:  No tenderness of the spinous process, no tenderness of the sacrum, no tenderness of the coccyx                  Bony Palpation of the Right Hip:  No tenderness of the iliac crest, no tenderness of the sciatic notch, no tenderness of the SI joint                  Bony Palpation of the Left  Hip:  No tenderness of the iliac crest, no tenderness of the sciatic notch, no tenderness of the SI joint                  Soft Tissue Palpation on the Right:  No tenderness of the paraspinal region, no tenderness of the iliolumbar region                  Soft Tissue Palpation on the Left:  No tenderness of the paraspinal region, no tenderness of the iliolumbar region    Motor Strength   L1 Right:  Hip flexion iliopsoas 5/5    L1 Left:  Hip flexion iliopsoas 5/5              L2-L4 Right:  Knee extension quadriceps 5/5, tibialis anterior 5/5              L2-L4 Left:  Knee extension quadriceps 5/5, tibialis anterior 5/5   L5 Right:  Extensor hallucis llongus 5/5,    L5 Left:  Extensor hallucis longus 5/5,    S1 Right:  Plantar flexion gastrocnemius 5/5   S1 Left:  Plantar flexion gastrocnemius 5/5    Neurological System   Ankle Reflex Right:  normal   Ankle Reflex Left: normal   Knee Reflex Right:  normal   Knee Reflex Left:  normal   Sensation on the Right:  L2 normal, L3 normal, L4 normal, L5 normal, S1 normal   Sensation on the Left:  L2 normal, L3 normal, L4 normal, L5 normal, S1 normal              Special Test on the Right:  Seated straight leg raising test negative, no clonus of the ankle              Special Test on the Left:  Seated straight leg raising test negative, no clonus of the ankle    Skin   Lumbosacral Spine:  Normal skin    Cardiovascular System   Arterial Pulses Right:  Posterior tibialis normal, dorsalis pedis normal   Arterial Pulses Left:  Posterior tibialis normal, dorsalis pedis normal   Edema Right: None   Edema Left:  None

## 2024-09-09 ENCOUNTER — OFFICE VISIT (OUTPATIENT)
Dept: GASTROENTEROLOGY | Facility: CLINIC | Age: 59
End: 2024-09-09

## 2024-09-09 VITALS
WEIGHT: 227 LBS | HEART RATE: 104 BPM | HEIGHT: 68 IN | OXYGEN SATURATION: 97 % | DIASTOLIC BLOOD PRESSURE: 73 MMHG | BODY MASS INDEX: 34.4 KG/M2 | SYSTOLIC BLOOD PRESSURE: 177 MMHG

## 2024-09-09 DIAGNOSIS — R11.2 NAUSEA AND VOMITING, UNSPECIFIED VOMITING TYPE: ICD-10-CM

## 2024-09-09 DIAGNOSIS — K92.1 MELENA: ICD-10-CM

## 2024-09-09 DIAGNOSIS — K21.9 GASTROESOPHAGEAL REFLUX DISEASE, UNSPECIFIED WHETHER ESOPHAGITIS PRESENT: ICD-10-CM

## 2024-09-09 DIAGNOSIS — R68.81 EARLY SATIETY: ICD-10-CM

## 2024-09-09 DIAGNOSIS — K92.1 BLOOD IN STOOL: ICD-10-CM

## 2024-09-09 DIAGNOSIS — R19.7 DIARRHEA, UNSPECIFIED TYPE: ICD-10-CM

## 2024-09-09 DIAGNOSIS — R10.32 LEFT LOWER QUADRANT PAIN: Primary | ICD-10-CM

## 2024-09-09 PROCEDURE — 99213 OFFICE O/P EST LOW 20 MIN: CPT | Mod: PBBFAC

## 2024-09-09 PROCEDURE — 99999 PR PBB SHADOW E&M-EST. PATIENT-LVL III: CPT | Mod: PBBFAC,,,

## 2024-09-09 PROCEDURE — 99204 OFFICE O/P NEW MOD 45 MIN: CPT | Mod: S$PBB,,,

## 2024-09-09 RX ORDER — PANTOPRAZOLE SODIUM 40 MG/1
40 TABLET, DELAYED RELEASE ORAL DAILY
Qty: 90 TABLET | Refills: 3 | Status: SHIPPED | OUTPATIENT
Start: 2024-09-09 | End: 2025-09-09

## 2024-09-09 RX ORDER — POLYETHYLENE GLYCOL 3350, SODIUM SULFATE ANHYDROUS, SODIUM BICARBONATE, SODIUM CHLORIDE, POTASSIUM CHLORIDE 236; 22.74; 6.74; 5.86; 2.97 G/4L; G/4L; G/4L; G/4L; G/4L
4 POWDER, FOR SOLUTION ORAL ONCE
Qty: 4000 ML | Refills: 0 | Status: SHIPPED | OUTPATIENT
Start: 2024-09-09 | End: 2024-09-09

## 2024-09-09 NOTE — PROGRESS NOTES
"  CC:  Melena, diarrhea, blood in her stool, bloating and early satiety      HPI 59 y.o. white female presents today with multiple complaints.  Patient admits to seeing black dark stools, and she has noticed bright red blood on her toilet paper.  Patient states she feels bloated, nauseous every morning upon waking and just eats a few bites food and then feels full and miserable.  Patient admits to having some left lower quadrant abdominal pain this is a daily occurrence.  Patient states she has been having diarrhea for over a year now and has a about 6-8 stools a day patient feels very tired and unheard and very emotional.  She is very worried about her health.  We will plan for labs today.  Has the ones in her chart are almost a year old.    Medical records reviewed. Additional history supplemented by nursing.     Past Medical History:   Diagnosis Date    HTN (hypertension)     Seizures          Past Surgical History:   Procedure Laterality Date    HYSTERECTOMY         Social History  Social History     Tobacco Use    Smoking status: Never     Passive exposure: Never    Smokeless tobacco: Never   Substance Use Topics    Alcohol use: Yes    Drug use: Never         Family History   Problem Relation Name Age of Onset    Heart disease Mother      Stroke Brother      Hypertension Brother         Review of Systems  General ROS: negative for chills, fever or weight loss  Psychological ROS: negative for hallucination, depression or suicidal ideation  Ophthalmic ROS: negative for blurry vision, photophobia or eye pain  ENT ROS: negative for epistaxis, sore throat or rhinorrhea  Respiratory ROS: no cough, shortness of breath, or wheezing  Cardiovascular ROS: no chest pain or dyspnea on exertion  Gastrointestinal ROS: no abdominal pain, change in bowel habits, or black/ bloody stools      Physical Examination  BP (!) 177/73   Pulse 104   Ht 5' 8" (1.727 m)   Wt 103 kg (227 lb)   LMP  (LMP Unknown)   SpO2 97%   BMI 34.52 " kg/m²   General appearance: alert, cooperative, no distress  HENT: Normocephalic, atraumatic, neck symmetrical, no nasal discharge   Eyes: conjunctivae/corneas clear, PERRL, EOM's intact  Lungs: no labored breathing, symmetric chest wall expansion bilaterally  Heart: regular rate and rhythm without rub; no displacement of the PMI   Abdomen: soft, non-tender; bowel sounds normoactive; no organomegaly    Labs:  Lab Results   Component Value Date    WBC 8.56 10/18/2023    HGB 16.2 (H) 10/18/2023    HCT 47.6 (H) 10/18/2023    MCV 93.0 10/18/2023     10/18/2023       CMP  Sodium   Date Value Ref Range Status   10/18/2023 137 136 - 145 mmol/L Final     Potassium   Date Value Ref Range Status   10/18/2023 4.5 3.5 - 5.1 mmol/L Final     Chloride   Date Value Ref Range Status   10/18/2023 103 98 - 107 mmol/L Final     CO2   Date Value Ref Range Status   10/18/2023 26 21 - 32 mmol/L Final     Glucose   Date Value Ref Range Status   10/18/2023 104 74 - 106 mg/dL Final     BUN   Date Value Ref Range Status   10/18/2023 11 7 - 18 mg/dL Final     Creatinine   Date Value Ref Range Status   10/18/2023 1.27 (H) 0.55 - 1.02 mg/dL Final     Calcium   Date Value Ref Range Status   10/18/2023 9.5 8.5 - 10.1 mg/dL Final     Total Protein   Date Value Ref Range Status   11/15/2023 8.2 6.4 - 8.2 g/dL Final     Albumin   Date Value Ref Range Status   10/18/2023 3.8 3.5 - 5.0 g/dL Final     Bilirubin, Total   Date Value Ref Range Status   10/18/2023 0.5 >0.0 - 1.2 mg/dL Final     Alk Phos   Date Value Ref Range Status   10/18/2023 154 (H) 46 - 118 U/L Final     AST   Date Value Ref Range Status   10/18/2023 27 15 - 37 U/L Final     ALT   Date Value Ref Range Status   10/18/2023 53 13 - 56 U/L Final     Anion Gap   Date Value Ref Range Status   10/18/2023 13 7 - 16 mmol/L Final       Imaging:  CT of abd and pelvis due to left lower quadrant pain  Gastric emptying study due to early satiety    Independently reviewed    Assessment: Jazmin  FIDENCIO Ruiz 60 yo WF here with:  Melena  BRBPR  Left lower quadrant abdominal pain  Nausea  Bloating  Early satiety      Plan:   EGD scheduled for melena  Colonoscopy scheduled for left lower quadrant pain and BRBPR  Gastric emptying study scheduled for early satiety  CT of the abdomen and pelvis scheduled for left lower quadrant pain  Labs today and stool studies for diarrhea  Protonix 40 mg daily for nausea and reflux  Follow-up in 3 or 4 months    -Over 45 minutes total face to face time and >50% spent in counseling and coordination of care with documented content discussed   Carla Adams, FNP [facility]

## 2024-09-09 NOTE — PATIENT INSTRUCTIONS
-Avoid spicy, greasy foods  -Avoid caffeine, citric acid, chocolate, peppermint, and carbonated drinks  -Do not lay down within 3 hours of eating  -Exercise 150 minutes per week  -Increase fluid to 64 ounces daily  -Avoid antiinflammatory medications such as motrin, advil, aleve, ibuprofen, and BC powder     - I recommend starting a daily fiber supplement with Citrucel or Fibercon (can purchase at your local pharmacy)    - I recommend drinking at least 60-80 ounces of water daily unless you have a medical condition that requires fluid restriction

## 2024-09-10 ENCOUNTER — HOSPITAL ENCOUNTER (OUTPATIENT)
Dept: GASTROENTEROLOGY | Facility: HOSPITAL | Age: 59
Discharge: HOME | End: 2024-09-10
Admitting: INTERNAL MEDICINE

## 2024-09-10 ENCOUNTER — ANESTHESIA (OUTPATIENT)
Dept: GASTROENTEROLOGY | Facility: HOSPITAL | Age: 59
End: 2024-09-10

## 2024-09-10 ENCOUNTER — ANESTHESIA EVENT (OUTPATIENT)
Dept: GASTROENTEROLOGY | Facility: HOSPITAL | Age: 59
End: 2024-09-10

## 2024-09-10 ENCOUNTER — TELEPHONE (OUTPATIENT)
Dept: GASTROENTEROLOGY | Facility: CLINIC | Age: 59
End: 2024-09-10

## 2024-09-10 VITALS
HEIGHT: 68 IN | SYSTOLIC BLOOD PRESSURE: 123 MMHG | RESPIRATION RATE: 11 BRPM | HEART RATE: 72 BPM | OXYGEN SATURATION: 99 % | BODY MASS INDEX: 34.4 KG/M2 | WEIGHT: 227 LBS | DIASTOLIC BLOOD PRESSURE: 48 MMHG | TEMPERATURE: 98 F

## 2024-09-10 DIAGNOSIS — K21.9 GASTROESOPHAGEAL REFLUX DISEASE, UNSPECIFIED WHETHER ESOPHAGITIS PRESENT: ICD-10-CM

## 2024-09-10 DIAGNOSIS — R68.81 EARLY SATIETY: ICD-10-CM

## 2024-09-10 DIAGNOSIS — K92.1 MELENA: ICD-10-CM

## 2024-09-10 LAB
C COLI+JEJ+UPSA DNA STL QL NAA+NON-PROBE: NEGATIVE
E COLI SXT1 STL QL IA: NEGATIVE
E COLI SXT2 STL QL IA: NEGATIVE
FATTY ACIDS: NORMAL /HPF
FECAL LEUKOCYTES: NEGATIVE
GIARDIA ANTIGEN: NEGATIVE
NEUTRAL FATS: NORMAL /HPF
NOROVIRUS GI+II RNA STL QL NAA+NON-PROBE: NEGATIVE
RVA RNA STL QL NAA+NON-PROBE: NEGATIVE
S ENT+BONG DNA STL QL NAA+NON-PROBE: NEGATIVE
SHIGELLA SPECIES NAT: NEGATIVE
V CHOL+PARA+VUL DNA STL QL NAA+NON-PROBE: NEGATIVE
Y ENTEROCOL DNA STL QL NAA+NON-PROBE: NEGATIVE

## 2024-09-10 PROCEDURE — 82705 FATS/LIPIDS FECES QUAL: CPT | Mod: ,,, | Performed by: CLINICAL MEDICAL LABORATORY

## 2024-09-10 PROCEDURE — 87506 IADNA-DNA/RNA PROBE TQ 6-11: CPT | Mod: ,,, | Performed by: CLINICAL MEDICAL LABORATORY

## 2024-09-10 PROCEDURE — 89055 LEUKOCYTE ASSESSMENT FECAL: CPT | Mod: ,,, | Performed by: CLINICAL MEDICAL LABORATORY

## 2024-09-10 PROCEDURE — 87329 GIARDIA AG IA: CPT | Mod: ,,, | Performed by: CLINICAL MEDICAL LABORATORY

## 2024-09-10 PROCEDURE — 25000003 PHARM REV CODE 250: Performed by: NURSE ANESTHETIST, CERTIFIED REGISTERED

## 2024-09-10 PROCEDURE — 43239 EGD BIOPSY SINGLE/MULTIPLE: CPT | Mod: ,,, | Performed by: INTERNAL MEDICINE

## 2024-09-10 PROCEDURE — 27000284 HC CANNULA NASAL: Performed by: NURSE ANESTHETIST, CERTIFIED REGISTERED

## 2024-09-10 PROCEDURE — 88305 TISSUE EXAM BY PATHOLOGIST: CPT | Mod: 26,59,, | Performed by: PATHOLOGY

## 2024-09-10 PROCEDURE — 43239 EGD BIOPSY SINGLE/MULTIPLE: CPT | Performed by: INTERNAL MEDICINE

## 2024-09-10 PROCEDURE — 63600175 PHARM REV CODE 636 W HCPCS: Performed by: NURSE ANESTHETIST, CERTIFIED REGISTERED

## 2024-09-10 PROCEDURE — 88342 IMHCHEM/IMCYTCHM 1ST ANTB: CPT | Mod: 26,,, | Performed by: PATHOLOGY

## 2024-09-10 PROCEDURE — 88342 IMHCHEM/IMCYTCHM 1ST ANTB: CPT | Mod: TC,SUR | Performed by: INTERNAL MEDICINE

## 2024-09-10 PROCEDURE — 27201423 OPTIME MED/SURG SUP & DEVICES STERILE SUPPLY

## 2024-09-10 PROCEDURE — 37000008 HC ANESTHESIA 1ST 15 MINUTES

## 2024-09-10 PROCEDURE — 87493 C DIFF AMPLIFIED PROBE: CPT | Mod: ,,, | Performed by: CLINICAL MEDICAL LABORATORY

## 2024-09-10 RX ORDER — SODIUM CHLORIDE, SODIUM LACTATE, POTASSIUM CHLORIDE, CALCIUM CHLORIDE 600; 310; 30; 20 MG/100ML; MG/100ML; MG/100ML; MG/100ML
INJECTION, SOLUTION INTRAVENOUS CONTINUOUS
Status: DISCONTINUED | OUTPATIENT
Start: 2024-09-10 | End: 2024-09-11 | Stop reason: HOSPADM

## 2024-09-10 RX ORDER — PROPOFOL 10 MG/ML
VIAL (ML) INTRAVENOUS
Status: DISCONTINUED | OUTPATIENT
Start: 2024-09-10 | End: 2024-09-10

## 2024-09-10 RX ORDER — SODIUM CHLORIDE 0.9 % (FLUSH) 0.9 %
10 SYRINGE (ML) INJECTION EVERY 6 HOURS PRN
Status: DISCONTINUED | OUTPATIENT
Start: 2024-09-10 | End: 2024-09-11 | Stop reason: HOSPADM

## 2024-09-10 RX ORDER — DIPHENHYDRAMINE HCL 25 MG
25 CAPSULE ORAL EVERY 6 HOURS PRN
COMMUNITY

## 2024-09-10 RX ORDER — LIDOCAINE HYDROCHLORIDE 20 MG/ML
INJECTION, SOLUTION EPIDURAL; INFILTRATION; INTRACAUDAL; PERINEURAL
Status: DISCONTINUED | OUTPATIENT
Start: 2024-09-10 | End: 2024-09-10

## 2024-09-10 RX ADMIN — PROPOFOL 25 MG: 10 INJECTION, EMULSION INTRAVENOUS at 09:09

## 2024-09-10 RX ADMIN — LIDOCAINE HYDROCHLORIDE 25 MG: 20 INJECTION, SOLUTION INTRAVENOUS at 09:09

## 2024-09-10 RX ADMIN — SODIUM CHLORIDE: 9 INJECTION, SOLUTION INTRAVENOUS at 09:09

## 2024-09-10 RX ADMIN — PROPOFOL 50 MG: 10 INJECTION, EMULSION INTRAVENOUS at 09:09

## 2024-09-10 NOTE — ANESTHESIA POSTPROCEDURE EVALUATION
Anesthesia Post Evaluation    Patient: Jazmin Ruiz    Procedure(s) Performed: EGD    Final Anesthesia Type: general      Patient location during evaluation: GI PACU  Patient participation: Yes- Able to Participate  Level of consciousness: awake and alert  Post-procedure vital signs: reviewed and stable  Pain management: adequate  Airway patency: patent    PONV status at discharge: No PONV  Anesthetic complications: no      Cardiovascular status: blood pressure returned to baseline and hemodynamically stable  Respiratory status: spontaneous ventilation, unassisted and room air  Hydration status: euvolemic  Follow-up not needed.              Vitals Value Taken Time   /46 09/10/24 0946   Temp 97.6f 09/10/24 1013   Pulse 72 09/10/24 0945   Resp 10 09/10/24 0946   SpO2 99 % 09/10/24 0945   Vitals shown include unfiled device data.      Event Time   Out of Recovery 09:58:32         Pain/Agustin Score: Agustin Score: 8 (9/10/2024  9:25 AM)

## 2024-09-10 NOTE — H&P
History & Physical - Short Stay  Gastroenterology      SUBJECTIVE:     Procedure: EGD    Chief Complaint/Indication for Procedure: Melena, diarrhea, blood in stool, abdominal pain    (Not in a hospital admission)    Review of patient's allergies indicates:  No Known Allergies     Past Medical History:   Diagnosis Date    HTN (hypertension)     Seizures      Past Surgical History:   Procedure Laterality Date    CARDIAC CATHETERIZATION      HYSTERECTOMY       Family History   Problem Relation Name Age of Onset    Heart disease Mother      Stroke Brother      Hypertension Brother       Social History     Tobacco Use    Smoking status: Never     Passive exposure: Never    Smokeless tobacco: Never   Substance Use Topics    Alcohol use: Yes     Comment: everyday    Drug use: Never     OBJECTIVE:     Vital Signs (Most Recent)  Temp: 97.5 °F (36.4 °C) (09/10/24 0743)  Pulse: 80 (09/10/24 0743)  Resp: 14 (09/10/24 0743)  BP: (!) 143/71 (09/10/24 0743)  SpO2: 95 % (09/10/24 0743)    Physical Exam:                                                       GENERAL:  Comfortable, in no acute distress.                                 HEENT EXAM:  Nonicteric.  No adenopathy.  Oropharynx is clear.               NECK:  Supple.                                                               LUNGS:  Clear.                                                               CARDIAC:  Regular rate and rhythm.  S1, S2.  No murmur.                      ABDOMEN:  Soft, positive bowel sounds, nontender.  No hepatosplenomegaly or masses.  No rebound or guarding.                                             EXTREMITIES:  No edema.     MENTAL STATUS:  Normal, alert and oriented.    ASSESSMENT/PLAN:     Assessment: Melena, diarrhea, blood in stool, abdominal pain    Plan: EGD

## 2024-09-10 NOTE — TELEPHONE ENCOUNTER
----- Message from Claudia Awad NP sent at 9/10/2024 12:45 PM CDT -----  So far all stool specimens that have resulted are normal

## 2024-09-10 NOTE — TELEPHONE ENCOUNTER
----- Message from Claudia Awad NP sent at 9/10/2024 12:45 PM CDT -----  Liver enzymes are slightly elevated.   No more alcohol  Exercise 150 minutes per week  Weight loss of 7-10%. Weight loss should be gradual  Diet low in saturated fats and carbohydrates  Good glucose and cholesterol control     So far all the labs that have come back are good. Still waiting on a few

## 2024-09-10 NOTE — TELEPHONE ENCOUNTER
Spoke w/ pt regarding stool study - results communicated - no good understanding noted - Provider JODI Awad to return call and address the pts concerns

## 2024-09-10 NOTE — TELEPHONE ENCOUNTER
Spoke w/ pt regarding lab - results/recommendations communicated  - res stated she had to take another call and would call back for other results

## 2024-09-10 NOTE — TELEPHONE ENCOUNTER
Pt w/ call back for lab results - reviewed results w/ no understanding r/t some abnormalities in lab work - request for further details to be provided per provider - concerns reported to provider as requested for call return and further explanation of results

## 2024-09-10 NOTE — ANESTHESIA PREPROCEDURE EVALUATION
09/10/2024  Jazmin Ruiz is a 59 y.o., female.      Pre-op Assessment    I have reviewed the Patient Summary Reports.    I have reviewed the NPO Status.   I have reviewed the Medications.     Review of Systems  Anesthesia Hx:  No problems with previous Anesthesia                Hematology/Oncology:  Hematology Normal   Oncology Normal                                   EENT/Dental:  EENT/Dental Normal           Cardiovascular:  Exercise tolerance: good   Hypertension             NYHA Classification II                           Pulmonary:        Sleep Apnea                Renal/:  Chronic Renal Disease                Hepatic/GI:  Hepatic/GI Normal                 Musculoskeletal:  Musculoskeletal Normal                Neurological:       Seizures                                Endocrine:  Endocrine Normal          Obesity / BMI > 30  Dermatological:  Skin Normal    Psych:  Psychiatric Normal                    Physical Exam  General: Well nourished, Cooperative, Alert and Oriented    Airway:  Mallampati: II   Mouth Opening: Normal  TM Distance: Normal  Tongue: Normal  Neck ROM: Normal ROM    Dental:  Intact    Chest/Lungs:  Normal Respiratory Rate    Heart:  Rate: Normal        Anesthesia Plan  Type of Anesthesia, risks & benefits discussed:    Anesthesia Type: Gen Natural Airway, MAC  Intra-op Monitoring Plan: Standard ASA Monitors  Post Op Pain Control Plan: multimodal analgesia  Induction:  IV  Informed Consent: Informed consent signed with the Patient and all parties understand the risks and agree with anesthesia plan.  All questions answered. Patient consented to blood products? Yes  ASA Score: 3  Day of Surgery Review of History & Physical: H&P Update referred to the surgeon/provider.I have interviewed and examined the patient. I have reviewed the patient's H&P dated: There are no significant  changes.     Ready For Surgery From Anesthesia Perspective.     .  Past Medical History:   Diagnosis Date    HTN (hypertension)     Seizures      Current Outpatient Medications   Medication Sig    alendronate (FOSAMAX) 70 MG tablet Take 1 tablet (70 mg total) by mouth every 7 days.    diphenhydrAMINE (BENADRYL) 25 mg capsule Take 25 mg by mouth every 6 (six) hours as needed for Itching.    diphenhydrAMINE-acetaminophen (TYLENOL PM)  mg Tab Take 1 tablet by mouth nightly as needed.    estradioL (ESTRACE) 1 MG tablet Take 1 tablet (1 mg total) by mouth once daily.    gabapentin (NEURONTIN) 300 MG capsule Take 1 capsule (300 mg total) by mouth 3 (three) times daily.    pantoprazole (PROTONIX) 40 MG tablet Take 1 tablet (40 mg total) by mouth once daily.     Current Facility-Administered Medications   Medication    lactated ringers infusion    sodium chloride 0.9% flush 10 mL

## 2024-09-10 NOTE — TRANSFER OF CARE
"Anesthesia Transfer of Care Note    Patient: Jazmin Ruiz    Procedure(s) Performed: EGD    Patient location: GI    Anesthesia Type: general    Transport from OR: Transported from OR on room air with adequate spontaneous ventilation. Continuous SpO2 monitoring in transport. Continuous ECG monitoring in transport    Post pain: adequate analgesia    Post assessment: no apparent anesthetic complications    Post vital signs: stable    Level of consciousness: responds to stimulation, alert and oriented    Nausea/Vomiting: no nausea/vomiting    Complications: none    Transfer of care protocol was followedComments: Did not react to insertion of scope      Last vitals: Visit Vitals  BP (!) 113/52   Pulse 76   Temp 36.4 °C (97.6 °F) (Skin)   Resp 18   Ht 5' 8" (1.727 m)   Wt 103 kg (227 lb)   LMP  (LMP Unknown)   SpO2 98%   Breastfeeding No   BMI 34.52 kg/m²     "

## 2024-09-10 NOTE — DISCHARGE INSTRUCTIONS
Procedure Date  9/10/24     Impression  Overall Impression:   The esophagus appeared normal.  Mild erythematous mucosa in the antrum; performed cold forceps biopsy  The duodenal bulb and 2nd part of the duodenum appeared normal.  Performed forceps biopsies in the duodenum to rule out celiac disease     Recommendation  - No EGD evidence of abnormality that would be contributing to GI blood loss; biopsies taken of duodenum to rule out celiac disease  - Colonoscopy as next step in evaluation  - Discharge patient to home  - Advance diet as tolerated  - Continue present medications  - Await pathology results  - Patient has a contact number available for emergencies. The signs and symptoms of potential delayed complications were discussed with the patient. Return to normal activities tomorrow. Written discharge instructions were provided to the patient.     Indication  Early satiety, Melena, Gastroesophageal reflux disease, unspecified whether esophagitis present

## 2024-09-11 ENCOUNTER — TELEPHONE (OUTPATIENT)
Dept: GASTROENTEROLOGY | Facility: CLINIC | Age: 59
End: 2024-09-11

## 2024-09-11 DIAGNOSIS — A04.72 C. DIFFICILE COLITIS: Primary | ICD-10-CM

## 2024-09-11 LAB
C DIFF TOX A+B STL IA-ACNC: POSITIVE
DHEA SERPL-MCNC: NORMAL
ESTROGEN SERPL-MCNC: NORMAL PG/ML
INSULIN SERPL-ACNC: NORMAL U[IU]/ML
LAB AP GROSS DESCRIPTION: NORMAL
LAB AP LABORATORY NOTES: NORMAL
T3RU NFR SERPL: NORMAL %

## 2024-09-11 RX ORDER — VANCOMYCIN HYDROCHLORIDE 125 MG/1
125 CAPSULE ORAL 4 TIMES DAILY
Qty: 40 CAPSULE | Refills: 0 | Status: SHIPPED | OUTPATIENT
Start: 2024-09-11 | End: 2024-09-21

## 2024-09-11 NOTE — TELEPHONE ENCOUNTER
----- Message from Claudia Awad NP sent at 9/11/2024  2:07 PM CDT -----  -C diff is highly contagious. Please practice good hand washing with soap and water.   -Clean all affected surfaces with 1 part bleach and 10 parts water.   -Try to use a dedicated bathroom separate from other family members if possible.   -Complete full therapy of antibiotics to ensure the infection clears.

## 2024-09-11 NOTE — TELEPHONE ENCOUNTER
I have called to speak to patient about positive C-diff results and I have left message on her husbands phone as I was unable to leave a Voicemail on her phone. I will send Vacomycin 125mg po QID x 10 days. I will send this to her pharmacy and will attempt to reach her once again. -C diff is highly contagious. Please practice good hand washing with soap and water.   -Clean all affected surfaces with 1 part bleach and 10 parts water.   -Try to use a dedicated bathroom separate from other family members if possible.   -Complete full therapy of antibiotics to ensure the infection clears.

## 2024-09-11 NOTE — TELEPHONE ENCOUNTER
----- Message from Claudia Awad NP sent at 9/11/2024  2:06 PM CDT -----  I have called and left this patient a message on her phone and her husbands phone. I will need to talk to her to explain her infection and the antibiotics she will be on. Please try to get her on the phone and I can talk to her once we get her.

## 2024-09-11 NOTE — PROGRESS NOTES
-C diff is highly contagious. Please practice good hand washing with soap and water.   -Clean all affected surfaces with 1 part bleach and 10 parts water.   -Try to use a dedicated bathroom separate from other family members if possible.   -Complete full therapy of antibiotics to ensure the infection clears.

## 2024-09-11 NOTE — PROGRESS NOTES
I have called and left this patient a message on her phone and her husbands phone. I will need to talk to her to explain her infection and the antibiotics she will be on. Please try to get her on the phone and I can talk to her once we get her.

## 2024-09-12 ENCOUNTER — TELEPHONE (OUTPATIENT)
Dept: GASTROENTEROLOGY | Facility: CLINIC | Age: 59
End: 2024-09-12

## 2024-09-13 ENCOUNTER — PATIENT MESSAGE (OUTPATIENT)
Dept: GASTROENTEROLOGY | Facility: CLINIC | Age: 59
End: 2024-09-13

## 2024-09-13 ENCOUNTER — TELEPHONE (OUTPATIENT)
Dept: GASTROENTEROLOGY | Facility: CLINIC | Age: 59
End: 2024-09-13

## 2024-09-13 LAB
CALPROTECTIN STL-MCNT: <50 MCG/G
ELASTASE PANC STL-MCNT: 449 MCG/G

## 2024-09-13 RX ORDER — POLYETHYLENE GLYCOL 3350, SODIUM SULFATE ANHYDROUS, SODIUM BICARBONATE, SODIUM CHLORIDE, POTASSIUM CHLORIDE 236; 22.74; 6.74; 5.86; 2.97 G/4L; G/4L; G/4L; G/4L; G/4L
4 POWDER, FOR SOLUTION ORAL ONCE
Qty: 4000 ML | Refills: 0 | Status: SHIPPED | OUTPATIENT
Start: 2024-09-13 | End: 2024-09-13

## 2024-09-13 NOTE — TELEPHONE ENCOUNTER
----- Message from Claudia Awad NP sent at 9/13/2024  8:31 AM CDT -----  I called the patient yesterday and the husbands number yesterday and left messages for her to call me. I have called her number this am and left another message inst patient that she has an infection in her colon and that she needs to  her ant  ibiotic that I have sent to her pharmacy. Left a message to please call back.

## 2024-09-13 NOTE — TELEPHONE ENCOUNTER
Attempts of notification w/o any success - VM left for pt to return call to review stood study results and tx plan

## 2024-09-25 ENCOUNTER — OFFICE VISIT (OUTPATIENT)
Dept: OBSTETRICS AND GYNECOLOGY | Facility: CLINIC | Age: 59
End: 2024-09-25

## 2024-09-25 ENCOUNTER — HOSPITAL ENCOUNTER (OUTPATIENT)
Dept: RADIOLOGY | Facility: HOSPITAL | Age: 59
Discharge: HOME | End: 2024-09-25

## 2024-09-25 VITALS
HEART RATE: 106 BPM | DIASTOLIC BLOOD PRESSURE: 89 MMHG | SYSTOLIC BLOOD PRESSURE: 155 MMHG | WEIGHT: 224 LBS | OXYGEN SATURATION: 99 % | RESPIRATION RATE: 18 BRPM | BODY MASS INDEX: 33.95 KG/M2 | HEIGHT: 68 IN

## 2024-09-25 DIAGNOSIS — E66.9 CLASS 1 OBESITY WITH SERIOUS COMORBIDITY AND BODY MASS INDEX (BMI) OF 34.0 TO 34.9 IN ADULT, UNSPECIFIED OBESITY TYPE: ICD-10-CM

## 2024-09-25 DIAGNOSIS — N94.19 DYSPAREUNIA DUE TO MEDICAL CONDITION IN FEMALE: ICD-10-CM

## 2024-09-25 DIAGNOSIS — R19.7 DIARRHEA, UNSPECIFIED TYPE: ICD-10-CM

## 2024-09-25 DIAGNOSIS — R31.29 MICROSCOPIC HEMATURIA: ICD-10-CM

## 2024-09-25 DIAGNOSIS — N95.0 POSTMENOPAUSAL VAGINAL BLEEDING: Primary | ICD-10-CM

## 2024-09-25 DIAGNOSIS — R53.83 FATIGUE, UNSPECIFIED TYPE: ICD-10-CM

## 2024-09-25 DIAGNOSIS — Z78.0 MENOPAUSE PRESENT: ICD-10-CM

## 2024-09-25 DIAGNOSIS — R10.32 LEFT LOWER QUADRANT PAIN: ICD-10-CM

## 2024-09-25 LAB
BACTERIA #/AREA URNS HPF: ABNORMAL /HPF
BILIRUB UR QL STRIP: NEGATIVE
CLARITY UR: CLEAR
COLOR UR: COLORLESS
CTP QC/QA: YES
FECAL OCCULT BLOOD, POC: NEGATIVE
GLUCOSE UR STRIP-MCNC: NORMAL MG/DL
KETONES UR STRIP-SCNC: NEGATIVE MG/DL
LEUKOCYTE ESTERASE UR QL STRIP: NEGATIVE
NITRITE UR QL STRIP: NEGATIVE
PH UR STRIP: 6 PH UNITS
PROT UR QL STRIP: 20
RBC # UR STRIP: NEGATIVE /UL
RBC #/AREA URNS HPF: 9 /HPF
SP GR UR STRIP: >1.05
SQUAMOUS #/AREA URNS LPF: ABNORMAL /HPF
UROBILINOGEN UR STRIP-ACNC: NORMAL MG/DL
WBC #/AREA URNS HPF: 5 /HPF

## 2024-09-25 PROCEDURE — 82270 OCCULT BLOOD FECES: CPT | Mod: PBBFAC | Performed by: OBSTETRICS & GYNECOLOGY

## 2024-09-25 PROCEDURE — 25500020 PHARM REV CODE 255

## 2024-09-25 PROCEDURE — 99214 OFFICE O/P EST MOD 30 MIN: CPT | Mod: S$PBB,,, | Performed by: OBSTETRICS & GYNECOLOGY

## 2024-09-25 PROCEDURE — 81001 URINALYSIS AUTO W/SCOPE: CPT | Mod: ,,, | Performed by: CLINICAL MEDICAL LABORATORY

## 2024-09-25 PROCEDURE — 74177 CT ABD & PELVIS W/CONTRAST: CPT | Mod: 26,,, | Performed by: RADIOLOGY

## 2024-09-25 PROCEDURE — 74177 CT ABD & PELVIS W/CONTRAST: CPT | Mod: TC

## 2024-09-25 PROCEDURE — A9698 NON-RAD CONTRAST MATERIALNOC: HCPCS

## 2024-09-25 PROCEDURE — 99214 OFFICE O/P EST MOD 30 MIN: CPT | Mod: PBBFAC,25 | Performed by: OBSTETRICS & GYNECOLOGY

## 2024-09-25 PROCEDURE — 99999 PR PBB SHADOW E&M-EST. PATIENT-LVL IV: CPT | Mod: PBBFAC,,, | Performed by: OBSTETRICS & GYNECOLOGY

## 2024-09-25 PROCEDURE — 99999PBSHW PR PBB SHADOW TECHNICAL ONLY FILED TO HB: Mod: PBBFAC,,,

## 2024-09-25 PROCEDURE — 99999PBSHW POCT OCCULT BLOOD STOOL: Mod: PBBFAC,,,

## 2024-09-25 RX ORDER — IOPAMIDOL 755 MG/ML
100 INJECTION, SOLUTION INTRAVASCULAR
Status: COMPLETED | OUTPATIENT
Start: 2024-09-25 | End: 2024-09-25

## 2024-09-25 RX ADMIN — BARIUM SULFATE 900 ML: 20 SUSPENSION ORAL at 10:09

## 2024-09-25 RX ADMIN — IOPAMIDOL 100 ML: 755 INJECTION, SOLUTION INTRAVENOUS at 09:09

## 2024-09-25 NOTE — PATIENT INSTRUCTIONS
Dr. Hernan Arshad thanks you for this office visit at [facility].    Diagnosis for this visit:   Problem List Items Addressed This Visit          GI    Diarrhea     Other Visit Diagnoses       Postmenopausal vaginal bleeding    -  Primary    Class 1 obesity with serious comorbidity and body mass index (BMI) of 34.0 to 34.9 in adult, unspecified obesity type        Fatigue, unspecified type        Menopause present        Improved on estradiol 1 mg daily    Dyspareunia due to medical condition in female                 The Plan: Patient was advised there was no pathology within the vagina on examination.  Hemoccult screen today was negative            Doctor Arshad does recommend monitoring the results of the CT scan of the abdomen; patient was performed a pelvic exam is negative for pathology; repeat urinalysis and urine reflex screen today           Doctor Arshad recommends continuing on estradiol orally 1 mg                          Doctor Arshad is recommendation as regards to the functional diarrhea as consider a elimination diet for possible food allergens.                             Doctor Arshad is recommendation as regards to the functional diarrhea as consider a elimination diet for possible food allergens.      Please practice best food and exercise habits for your age.    Dr. Hernan Arshad recommends avoidance of smoking and illicit medications or habits.    Please call  or schedule for any change in your health.     Please keep the next scheduled appointment or call for any need to change the appointment.     Dr. Hernan Arshad recommends yearly exams for good health maintenance.      Thank you    Dr. Hernan Arshad  09/25/2024 10:52 AM

## 2024-09-25 NOTE — PROGRESS NOTES
Jazmin Ruiz female  for   Chief Complaint   Patient presents with    Vaginal Bleeding     Patient is here due to vag bleeding, last month she was hurting on the left side groin area, this pain caused her to be unable to do her daily activities added she also had loose stool as well.  She has abdominal swelling (bloating) as well.       OB History          5    Para   3    Term                AB   2    Living             SAB        IAB        Ectopic        Multiple        Live Births                      PHI:  Patient returns with a history of some bright red vaginal bleeding on 2024.  Patient has had some reoccurrence of this symptomatology in the past.  Patient is a history of a ELLA/BSO by Dr. Mark Medina at age 32 for endometriosis.      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  She is under the care of GI Medicine.  She does describe bloody stools.  Patient describes low back ache since May of 2024.  Due to the extensiveness of multiple loose stools daytime as well as nighttime, patient was stopped working and has restricted herself staining at home.        She has fear of some pelvic mass.  Some intermittent chronic right lower quadrant pain in 2028 with the onset of the vaginal bleeding she had some shooting pains in the left lower quadrant.    Patient does complain of dyspareunia that has precluded sexual activity for the past 3 years.  Patient was prescribed last year topical vaginal estrogen but she currently is not using topical vaginal estrogen at this time.    She describes dermatitis when she has use transdermal estrogen.  Patient does use Estrace 1 mg daily with relief of hot flashes.    Patient's labs were reviewed today.    Past Medical History:   Diagnosis Date    HTN (hypertension)     Seizures       Past Surgical History:   Procedure Laterality Date    CARDIAC CATHETERIZATION      HYSTERECTOMY        Review of  "patient's allergies indicates:  No Known Allergies     ROS:Pertinent items are noted in HPI.    Physical exam:This gyn related exam was chaperoned by a female medical assistant.      BP (!) 155/89 (BP Location: Right arm, Patient Position: Sitting, BP Method: Large (Automatic)) Comment: Pt does not hae PCP, she is not on any BP meds.  Pulse 106   Resp 18   Ht 5' 8" (1.727 m)   Wt 101.6 kg (224 lb)   LMP  (LMP Unknown)   SpO2 99%   BMI 34.06 kg/m²      General Appearance: healthy, alert, no distress, smiling, BMI of 34    HEENT: normal exam    Lymphatic: no palpable lymphadenopathy, no hepatosplenomegaly    Chest:         Breasts:  Not exam         Lungs: clear to auscultation bilaterally         Heart: regular rate and rhythm, S1, S2 normal, no murmur, click, rub or gallop    Abdomen:soft, non-tender, without masses or organomegaly, normal bowel sounds, without guarding, without rebound, and well-healed Pfannenstiel incision without any evidence of ventral incisional hernia    Pelvic:             Vulva:normal, normal female genitalia, Bartholin's, Urethra, Orion normal, no lesions, normal female hair distribution, no clitoral enlargement            Vagina:normal mucosa, no discharge, moist vaginal walls with some leukorrhea but no evidence of neoplasia; no evidence of granulation tissue of the vaginal apex; there is no evidence of laceration of the vaginal walls; there is no evidence of any tumor nodules within the vagina; patient has no vaginal odor; and there is no evidence of a cystocele or vault prolapse. Rectocele - unchanged and asx.            Uterus: surgically absent uterus and cervix            Adnexae: surgically absent    Rectal:negative, stool guaiac negative; deep rectal exam fails reveal any abnormality of the lower abdomen.  No pelvic mass was appreciated.    Extremity: normal, extremities warm, no clubbing, no cyanosis, no edema, non-tender    Skin: normal exam        Assessment:   Problem " List Items Addressed This Visit          GI    Diarrhea     Other Visit Diagnoses       Postmenopausal vaginal bleeding    -  Primary    Class 1 obesity with serious comorbidity and body mass index (BMI) of 34.0 to 34.9 in adult, unspecified obesity type        Fatigue, unspecified type        Menopause present        Improved on estradiol 1 mg daily             Plan:  Patient was advised there was no pathology within the vagina on examination.  Hemoccult screen today was negative            Doctor Arshad does recommend monitoring the results of the CT scan of the abdomen; patient was performed a pelvic exam is negative for pathology; repeat urinalysis and urine reflex screen today           Doctor Arshad recommends continuing on estradiol orally 1 mg                         Doctor Arshad is recommendation as regards to the functional diarrhea as consider a elimination diet for possible food allergens.           UA and reflex urine culture today.  UA ordered due evaluate for any possible hematuria as a cause of the bright red vaginal bleeding.  Patient did show a picture of the bright red blood from the either vagina, urethra or rectum.  Most likely cause is from the functional diarrhea.    Addendum on 09/26/2024:  Patient was found to have microscopic hematuria.  This is a new finding as compared to previous laboratory studies.  Doctor Arshad recommends a repeat urinalysis.

## 2024-09-26 ENCOUNTER — TELEPHONE (OUTPATIENT)
Dept: GASTROENTEROLOGY | Facility: CLINIC | Age: 59
End: 2024-09-26

## 2024-09-26 ENCOUNTER — PATIENT MESSAGE (OUTPATIENT)
Dept: GASTROENTEROLOGY | Facility: CLINIC | Age: 59
End: 2024-09-26

## 2024-09-26 NOTE — PROGRESS NOTES
Hepatic steatosis.    Subcentimeter enhancing splenic focus, nonspecific but likely benign, possibly hemangioma.  I have sent a message to the patient via portal as this is the way she prefers to communicate.

## 2024-09-26 NOTE — TELEPHONE ENCOUNTER
Message sent to pt regarding results/recommendations via portal r/t this is the pts recommended communication method

## 2024-09-27 ENCOUNTER — TELEPHONE (OUTPATIENT)
Dept: OBSTETRICS AND GYNECOLOGY | Facility: CLINIC | Age: 59
End: 2024-09-27

## 2024-09-27 NOTE — TELEPHONE ENCOUNTER
----- Message from Hernan Arshad MD sent at 9/26/2024  7:15 AM CDT -----  Addendum on 09/26/2024:  Patient was found to have microscopic hematuria.  This is a new finding as compared to previous laboratory studies.  Doctor Jeancarlos recommends a repeat urinalysis.

## 2024-09-30 ENCOUNTER — TELEPHONE (OUTPATIENT)
Dept: OBSTETRICS AND GYNECOLOGY | Facility: CLINIC | Age: 59
End: 2024-09-30

## 2024-09-30 DIAGNOSIS — R82.90 ABNORMAL URINE FINDING: Primary | ICD-10-CM

## 2024-09-30 NOTE — TELEPHONE ENCOUNTER
----- Message from Hernan Arshad MD sent at 9/27/2024  7:49 AM CDT -----  Regarding: Repeat urinalysis  Probably within the next 2 weeks for repeat urinalysis

## 2024-10-01 ENCOUNTER — TELEPHONE (OUTPATIENT)
Dept: OBSTETRICS AND GYNECOLOGY | Facility: CLINIC | Age: 59
End: 2024-10-01

## 2024-10-01 NOTE — TELEPHONE ENCOUNTER
----- Message from Hernan Arshad MD sent at 9/26/2024  7:15 AM CDT -----  Addendum on 09/26/2024:  Patient was found to have microscopic hematuria.  This is a new finding as compared to previous laboratory studies.  Doctor Arshad recommends a repeat urinalysis.

## 2024-10-03 ENCOUNTER — PATIENT MESSAGE (OUTPATIENT)
Dept: OBSTETRICS AND GYNECOLOGY | Facility: CLINIC | Age: 59
End: 2024-10-03

## 2024-10-15 DIAGNOSIS — K92.1 MELENA: Primary | ICD-10-CM

## 2024-10-15 NOTE — PROGRESS NOTES
Pt messaged she is having dark stools will plan to draw labs cbc and cmp. Messaged patient thru the portal

## 2024-10-21 ENCOUNTER — PATIENT MESSAGE (OUTPATIENT)
Dept: GASTROENTEROLOGY | Facility: CLINIC | Age: 59
End: 2024-10-21

## 2024-10-22 ENCOUNTER — TELEPHONE (OUTPATIENT)
Dept: GASTROENTEROLOGY | Facility: CLINIC | Age: 59
End: 2024-10-22

## 2024-10-22 DIAGNOSIS — R19.7 DIARRHEA, UNSPECIFIED TYPE: Primary | ICD-10-CM

## 2024-10-22 DIAGNOSIS — A04.72 C. DIFFICILE COLITIS: ICD-10-CM

## 2024-11-12 RX ORDER — ESTRADIOL 1 MG/1
1 TABLET ORAL DAILY
Qty: 30 TABLET | Refills: 11 | Status: SHIPPED | OUTPATIENT
Start: 2024-11-12 | End: 2025-11-12

## 2024-11-12 NOTE — TELEPHONE ENCOUNTER
----- Message from Tech Laturina sent at 11/12/2024  2:12 PM CST -----  Who Called: Jazmin Ruiz    Refill or New Rx:Refill  RX Name and Strength:estradioL (ESTRACE) 1 MG tablet  How is the patient currently taking it? (ex. 1XDay): 1XDAY  Is this a 30 day or 90 day RX: 30 Day     If different Pharmacy is requested, enter Pharmacy information here including location and phone number: [facility] [place] [facility] [address]     Ordering Provider:DR JOHNSTON       Preferred Method of Contact: Phone Call  Patient's Preferred Phone Number on File: [phone]   Best Call Back Number, if different:  Additional Information: Patient needs a refill said that she is out of the tablets.

## 2024-12-09 RX ORDER — ALENDRONATE SODIUM 70 MG/1
70 TABLET ORAL
Qty: 4 TABLET | Refills: 0 | Status: SHIPPED | OUTPATIENT
Start: 2024-12-09 | End: 2024-12-09 | Stop reason: SDUPTHER

## 2024-12-09 RX ORDER — ALENDRONATE SODIUM 70 MG/1
70 TABLET ORAL
Qty: 4 TABLET | Refills: 0 | Status: SHIPPED | OUTPATIENT
Start: 2024-12-09

## 2024-12-09 NOTE — TELEPHONE ENCOUNTER
----- Message from Stephany sent at 12/6/2024  2:42 PM CST -----  Regarding: rx  Who Called: Jazmin Ruiz    Refill or New Rx:Refill  RX Name and Strength:alendronate (FOSAMAX) 70 MG tablet  How is the patient currently taking it? (ex. 1XDay):1x7day  Is this a 30 day or 90 day RX:90  Local or Mail Order:local   List of preferred pharmacies on file (remove unneeded): [unfilled]  If different Pharmacy is requested, enter Pharmacy information here including location and phone number: [facility] [address] [place] [address] [address] [facility] [address]  [address] [facility] [address] [facility] [address]  Phone: [phone] Fax: [phone]  Hours: Not open 24 hours       Ordering Provider:Jeancarlos       Preferred Method of Contact: Phone Call  Patient's Preferred Phone Number on File: [phone]   Best Call Back Number, if different:  Additional Information:

## 2024-12-27 ENCOUNTER — TELEPHONE (OUTPATIENT)
Dept: GASTROENTEROLOGY | Facility: CLINIC | Age: 59
End: 2024-12-27

## 2025-01-08 DIAGNOSIS — M54.16 LUMBAR RADICULOPATHY: ICD-10-CM

## 2025-01-14 ENCOUNTER — TELEPHONE (OUTPATIENT)
Dept: FAMILY MEDICINE | Facility: CLINIC | Age: 60
End: 2025-01-14

## 2025-01-14 DIAGNOSIS — M81.6 LOCALIZED OSTEOPOROSIS WITHOUT CURRENT PATHOLOGICAL FRACTURE: Primary | ICD-10-CM

## 2025-01-14 NOTE — TELEPHONE ENCOUNTER
----- Message from Ria sent at 1/14/2025 11:12 AM CST -----  PLEASE CALL BACK SHE WANTED TO ASK YOU SOME QUESTIONS ABOUT APPOINTMENT  [phone]

## 2025-01-15 ENCOUNTER — TELEPHONE (OUTPATIENT)
Dept: FAMILY MEDICINE | Facility: CLINIC | Age: 60
End: 2025-01-15

## 2025-01-15 RX ORDER — ALENDRONATE SODIUM 70 MG/1
70 TABLET ORAL
Qty: 4 TABLET | Refills: 0 | Status: SHIPPED | OUTPATIENT
Start: 2025-01-15

## 2025-01-16 ENCOUNTER — OFFICE VISIT (OUTPATIENT)
Dept: FAMILY MEDICINE | Facility: CLINIC | Age: 60
End: 2025-01-16

## 2025-01-16 VITALS
WEIGHT: 222 LBS | DIASTOLIC BLOOD PRESSURE: 84 MMHG | SYSTOLIC BLOOD PRESSURE: 137 MMHG | OXYGEN SATURATION: 96 % | TEMPERATURE: 98 F | HEIGHT: 68 IN | HEART RATE: 80 BPM | RESPIRATION RATE: 18 BRPM | BODY MASS INDEX: 33.65 KG/M2

## 2025-01-16 DIAGNOSIS — R10.9 ABDOMINAL PAIN, UNSPECIFIED ABDOMINAL LOCATION: ICD-10-CM

## 2025-01-16 DIAGNOSIS — M54.50 LOW BACK PAIN, UNSPECIFIED BACK PAIN LATERALITY, UNSPECIFIED CHRONICITY, UNSPECIFIED WHETHER SCIATICA PRESENT: Primary | ICD-10-CM

## 2025-01-16 PROCEDURE — 99204 OFFICE O/P NEW MOD 45 MIN: CPT | Mod: ,,, | Performed by: INTERNAL MEDICINE

## 2025-01-17 ENCOUNTER — TELEPHONE (OUTPATIENT)
Dept: EMERGENCY MEDICINE | Facility: HOSPITAL | Age: 60
End: 2025-01-17

## 2025-01-21 NOTE — PROGRESS NOTES
Subjective:       Patient ID: Jazmin Ruiz is a 59 y.o. female.    Chief Complaint: Establish Care, Back Pain, and Abdominal Pain    History of Present Illness    CHIEF COMPLAINT:  Patient presents today for pain in multiple areas of the body.    PAIN ASSESSMENT:  She experiences liver and spleen pain, along with chest pain described as stabbing. She reports back pain due to degenerative bone disease affecting her lower back, mid-back, and extending to her neck. She also experiences bone pain in her arms and hands.    RESPIRATORY:  She reports mild shortness of breath.    GASTROINTESTINAL:  She has a history of diarrhea occurring 7-8 times daily, including nocturnal episodes, with extreme pain. Currently experiencing constipation with recent episodes of black, tarry stools after 8 days without bowel movements, despite using Miralax for three consecutive days, suppositories, and increased fluid intake. She has a history of rectal bleeding but denies blood in stools or on tissue in the past three days.    IMAGING:  CT from September showed hepatic steatosis and a subcentimeter enhancing splenic focus, likely benign and possibly a hemangioma.    SURGICAL HISTORY:  History of hysterectomy at age 32 and upper endoscopy in September.    CURRENT MEDICATIONS:  She takes hormone medicine and Foximax for osteoporosis prescribed by Dr. Arshad, Thompson prescribed by Wanda, and an unspecified sleep medication taken as two doses at night.    FAMILY HISTORY:  Mother has autoimmune disease and mitral valve prolapse. Brother has history of multiple strokes. Cancer history in paternal family members.    SOCIAL HISTORY:  Denies smoking. Past alcohol use but no current consumption.         Current Medications:    Current Outpatient Medications:     alendronate (FOSAMAX) 70 MG tablet, Take 1 tablet (70 mg total) by mouth every 7 days., Disp: 4 tablet, Rfl: 0    estradioL (ESTRACE) 1 MG tablet, Take 1 tablet (1 mg total) by  "mouth once daily., Disp: 30 tablet, Rfl: 11    pantoprazole (PROTONIX) 40 MG tablet, Take 1 tablet (40 mg total) by mouth once daily., Disp: 90 tablet, Rfl: 3    diphenhydrAMINE (BENADRYL) 25 mg capsule, Take 25 mg by mouth every 6 (six) hours as needed for Itching., Disp: , Rfl:     diphenhydrAMINE-acetaminophen (TYLENOL PM)  mg Tab, Take 1 tablet by mouth nightly as needed., Disp: , Rfl:     gabapentin (NEURONTIN) 300 MG capsule, Take 1 capsule (300 mg total) by mouth 3 (three) times daily., Disp: 90 capsule, Rfl: 11           ROS  Twelve point system reviewed, unremarkable except for stated above in HPI.        Objective:         Vitals:    01/16/25 1527   BP: 137/84   BP Location: Right arm   Patient Position: Sitting   Pulse: 80   Resp: 18   Temp: 97.6 °F (36.4 °C)   TempSrc: Temporal   SpO2: 96%   Weight: 100.7 kg (222 lb)   Height: 5' 8" (1.727 m)        Physical Exam     Patient is awake alert oriented person place and  Lungs are clear to auscultation bilaterally no crackles or wheezes   Cardiovascular S1-S2 regular rate and rhythm no murmurs rubs or gallops   Abdomen is soft positive bowel sounds nontender, extremities no clubbing cyanosis edema  Neuro no focal neurological deficits  Skin warm and dry.     Last Labs:     No visits with results within 1 Month(s) from this visit.   Latest known visit with results is:   Lab Visit on 10/21/2024   Component Date Value    Sodium 10/21/2024 138     Potassium 10/21/2024 4.0     Chloride 10/21/2024 107     CO2 10/21/2024 27     Anion Gap 10/21/2024 8     Glucose 10/21/2024 103     BUN 10/21/2024 8     Creatinine 10/21/2024 0.89     BUN/Creatinine Ratio 10/21/2024 9     Calcium 10/21/2024 9.0     Total Protein 10/21/2024 8.0     Albumin 10/21/2024 3.3 (L)     Globulin 10/21/2024 4.7 (H)     A/G Ratio 10/21/2024 0.7     Bilirubin, Total 10/21/2024 0.6     Alk Phos 10/21/2024 97     ALT 10/21/2024 57 (H)     AST 10/21/2024 47 (H)     eGFR 10/21/2024 75     WBC " 10/21/2024 4.59     RBC 10/21/2024 4.59     Hemoglobin 10/21/2024 14.1     Hematocrit 10/21/2024 43.5     MCV 10/21/2024 94.8     MCH 10/21/2024 30.7     MCHC 10/21/2024 32.4     RDW 10/21/2024 13.1     Platelet Count 10/21/2024 200     MPV 10/21/2024 10.1     Neutrophils % 10/21/2024 47.3 (L)     Lymphocytes % 10/21/2024 39.2     Monocytes % 10/21/2024 8.7 (H)     Eosinophils % 10/21/2024 3.3     Basophils % 10/21/2024 1.3 (H)     Immature Granulocytes % 10/21/2024 0.2     nRBC, Auto 10/21/2024 0.0     Neutrophils, Abs 10/21/2024 2.17     Lymphocytes, Absolute 10/21/2024 1.80     Monocytes, Absolute 10/21/2024 0.40     Eosinophils, Absolute 10/21/2024 0.15     Basophils, Absolute 10/21/2024 0.06     Immature Granulocytes, A* 10/21/2024 0.01     nRBC, Absolute 10/21/2024 0.00     Diff Type 10/21/2024 Auto        Last Imaging:  CT Abdomen Pelvis With IV Contrast Routine Oral Contrast  Narrative: EXAMINATION:  CT ABDOMEN PELVIS WITH IV CONTRAST    CLINICAL HISTORY:  LLQ abdominal pain; Left lower quadrant pain    TECHNIQUE:  Low dose axial images, sagittal and coronal reformations were obtained from the lung bases to the pubic symphysis following the IV administration of 100 mL of Isovue 370 and oral administration 900 cc Readi-Cat    COMPARISON:  None.    FINDINGS:  The liver is hypoattenuating indicative of fatty infiltration.  The gallbladder, pancreas, and adrenal glands are unremarkable.  There are a few old calcified granulomas of the spleen.  There is a subcentimeter hand sing focus of the spleen measuring 6 mm which is nonspecific but may be a tiny hemangioma.  The kidneys are unremarkable.    The small bowel is normal caliber and appearance.  The appendix is normal.  The colon is unremarkable.    There has been hysterectomy.  There is mild calcification of the aorta without aneurysm.  There is a retroaortic left renal vein.  There is an old calcified granuloma at the left lung base.    Degenerative change of  the spine typical for age is present.  Impression: Hepatic steatosis.    Subcentimeter enhancing splenic focus, nonspecific but likely benign, possibly hemangioma.    Electronically signed by: Rob Orellana MD  Date:    09/25/2024  Time:    15:24         **Labs and x-rays personally reviewed by me    ** reviewed           Assessment & Plan:   Assessment & Plan    IMPRESSION:  - Assessed abdominal pain, back pain, and recent melena  - Identified melena as potential sign of GI bleed requiring immediate evaluation  - Reviewed previous CT results from September, noting:  - - Hepatic steatosis  - - 6mm subcentimeter enhancing splenic foci, likely benign hemangioma  - - Normal gallbladder, pancreas, adrenal glands, kidneys, small bowel, appendix, and colon  - Plan to repeat CT and comprehensive bloodwork to reassess liver, kidney, and pancreatic function  - Considering irritable bowel syndrome as possible diagnosis for fluctuating diarrhea and constipation    POTENTIAL GI BLEED:  - Explained significance of melena as potential sign of GI bleed.  - Referred to [facility] for immediate evaluation of potential GI bleed.  - Ordered CT of abdomen (to be done at ER).    HEPATIC STEATOSIS:  - Discussed hepatic steatosis as common condition related to aging and weight gain.    SPLENIC LESION:  - Clarified that small splenic lesion (6mm) is likely benign and not cause for immediate concern.    HYPERTENSION:  - Patient to check blood pressure daily for 2 weeks and record readings.  - Patient to bring blood pressure readings to next appointment.    MEDICATIONS/SUPPLEMENTS:  - Continued Foximax.    BACK PAIN:  - Ordered X-rays of lower back and spine.    LABS:  - Ordered comprehensive labs including liver, kidney, and pancreatic function tests (to be done at ER).  - Ordered Vitamin D level check.    FOLLOW UP:  - Follow up in 2 weeks.           1. Low back pain, unspecified back pain laterality, unspecified chronicity,  unspecified whether sciatica present  -     X-Ray Lumbar Spine Ap And Lateral; Future; Expected date: 01/16/2025  -     X-Ray Chest PA And Lateral; Future; Expected date: 01/16/2025  -     X-Ray Thoracic Spine AP Lateral; Future; Expected date: 01/16/2025    2. Abdominal pain, unspecified abdominal location  -     X-Ray KUB; Future; Expected date: 01/16/2025  -     CT Abdomen Pelvis With IV Contrast Routine Oral Contrast; Future; Expected date: 01/16/2025      Patient also complains of melena.  Therefore will refer to emergency room today      Sanchez Baez MD  This note was generated with the assistance of ambient listening technology. Verbal consent was obtained by the patient and accompanying visitor(s) for the recording of patient appointment to facilitate this note. I attest to having reviewed and edited the generated note for accuracy, though some syntax or spelling errors may persist. Please contact the author of this note for any clarification.

## 2025-01-31 RX ORDER — GABAPENTIN 300 MG/1
300 CAPSULE ORAL 3 TIMES DAILY
Qty: 90 CAPSULE | Refills: 0 | OUTPATIENT
Start: 2025-01-31

## 2025-02-07 ENCOUNTER — HOSPITAL ENCOUNTER (OUTPATIENT)
Dept: RADIOLOGY | Facility: HOSPITAL | Age: 60
Discharge: HOME | End: 2025-02-07
Attending: INTERNAL MEDICINE

## 2025-02-07 DIAGNOSIS — R10.9 ABDOMINAL PAIN, UNSPECIFIED ABDOMINAL LOCATION: ICD-10-CM

## 2025-02-07 PROCEDURE — 74177 CT ABD & PELVIS W/CONTRAST: CPT | Mod: TC

## 2025-02-07 PROCEDURE — 25500020 PHARM REV CODE 255: Performed by: INTERNAL MEDICINE

## 2025-02-07 PROCEDURE — 74177 CT ABD & PELVIS W/CONTRAST: CPT | Mod: 26,,, | Performed by: RADIOLOGY

## 2025-02-07 PROCEDURE — A9698 NON-RAD CONTRAST MATERIALNOC: HCPCS | Performed by: INTERNAL MEDICINE

## 2025-02-07 RX ORDER — IOPAMIDOL 755 MG/ML
100 INJECTION, SOLUTION INTRAVASCULAR
Status: COMPLETED | OUTPATIENT
Start: 2025-02-07 | End: 2025-02-07

## 2025-02-07 RX ADMIN — IOPAMIDOL 100 ML: 755 INJECTION, SOLUTION INTRAVENOUS at 09:02

## 2025-02-07 RX ADMIN — BARIUM SULFATE 1150 ML: 20 SUSPENSION ORAL at 09:02

## 2025-02-10 ENCOUNTER — PATIENT MESSAGE (OUTPATIENT)
Dept: FAMILY MEDICINE | Facility: CLINIC | Age: 60
End: 2025-02-10

## 2025-02-11 ENCOUNTER — TELEPHONE (OUTPATIENT)
Dept: GASTROENTEROLOGY | Facility: CLINIC | Age: 60
End: 2025-02-11

## 2025-02-11 NOTE — TELEPHONE ENCOUNTER
----- Message from Claudia sent at 2/11/2025 10:18 AM CST -----  Who Called: Jazmin Ruiz      Who Left Message for Patient: Vish Banks  Does the patient know what this is regarding?: pr      Preferred Method of Contact: Phone Call  Patient's Preferred Phone Number on File: [phone]     Additional Information:  pt was calling back to speak with Mrs. Wahl about her colonscopy procedure. Please contact pt for more info

## 2025-02-13 ENCOUNTER — TELEPHONE (OUTPATIENT)
Dept: GASTROENTEROLOGY | Facility: CLINIC | Age: 60
End: 2025-02-13

## 2025-02-13 ENCOUNTER — PATIENT MESSAGE (OUTPATIENT)
Dept: GASTROENTEROLOGY | Facility: CLINIC | Age: 60
End: 2025-02-13

## 2025-02-24 ENCOUNTER — ANESTHESIA EVENT (OUTPATIENT)
Dept: GASTROENTEROLOGY | Facility: HOSPITAL | Age: 60
End: 2025-02-24

## 2025-02-24 ENCOUNTER — HOSPITAL ENCOUNTER (OUTPATIENT)
Dept: GASTROENTEROLOGY | Facility: HOSPITAL | Age: 60
Discharge: HOME | End: 2025-02-24
Admitting: INTERNAL MEDICINE

## 2025-02-24 ENCOUNTER — ANESTHESIA (OUTPATIENT)
Dept: GASTROENTEROLOGY | Facility: HOSPITAL | Age: 60
End: 2025-02-24

## 2025-02-24 VITALS
BODY MASS INDEX: 33.65 KG/M2 | HEIGHT: 68 IN | DIASTOLIC BLOOD PRESSURE: 101 MMHG | WEIGHT: 222 LBS | TEMPERATURE: 98 F | OXYGEN SATURATION: 97 % | SYSTOLIC BLOOD PRESSURE: 131 MMHG | HEART RATE: 69 BPM | RESPIRATION RATE: 16 BRPM

## 2025-02-24 DIAGNOSIS — R10.32 LEFT LOWER QUADRANT PAIN: ICD-10-CM

## 2025-02-24 DIAGNOSIS — K92.1 BLOOD IN STOOL: ICD-10-CM

## 2025-02-24 PROCEDURE — 88305 TISSUE EXAM BY PATHOLOGIST: CPT | Mod: TC,91,SUR | Performed by: INTERNAL MEDICINE

## 2025-02-24 PROCEDURE — 45385 COLONOSCOPY W/LESION REMOVAL: CPT | Mod: ,,, | Performed by: INTERNAL MEDICINE

## 2025-02-24 PROCEDURE — 25000003 PHARM REV CODE 250: Performed by: NURSE ANESTHETIST, CERTIFIED REGISTERED

## 2025-02-24 PROCEDURE — 88305 TISSUE EXAM BY PATHOLOGIST: CPT | Mod: 26,59,, | Performed by: PATHOLOGY

## 2025-02-24 PROCEDURE — 37000008 HC ANESTHESIA 1ST 15 MINUTES

## 2025-02-24 PROCEDURE — D9220A PRA ANESTHESIA: Mod: ,,, | Performed by: NURSE ANESTHETIST, CERTIFIED REGISTERED

## 2025-02-24 PROCEDURE — 45385 COLONOSCOPY W/LESION REMOVAL: CPT | Performed by: INTERNAL MEDICINE

## 2025-02-24 PROCEDURE — 45380 COLONOSCOPY AND BIOPSY: CPT | Mod: 59 | Performed by: INTERNAL MEDICINE

## 2025-02-24 PROCEDURE — 27201423 OPTIME MED/SURG SUP & DEVICES STERILE SUPPLY

## 2025-02-24 PROCEDURE — 45380 COLONOSCOPY AND BIOPSY: CPT | Mod: 59,,, | Performed by: INTERNAL MEDICINE

## 2025-02-24 PROCEDURE — 37000009 HC ANESTHESIA EA ADD 15 MINS

## 2025-02-24 PROCEDURE — 63600175 PHARM REV CODE 636 W HCPCS: Performed by: NURSE ANESTHETIST, CERTIFIED REGISTERED

## 2025-02-24 RX ORDER — SODIUM CHLORIDE, SODIUM LACTATE, POTASSIUM CHLORIDE, CALCIUM CHLORIDE 600; 310; 30; 20 MG/100ML; MG/100ML; MG/100ML; MG/100ML
INJECTION, SOLUTION INTRAVENOUS CONTINUOUS
Status: DISCONTINUED | OUTPATIENT
Start: 2025-02-24 | End: 2025-02-25 | Stop reason: HOSPADM

## 2025-02-24 RX ORDER — SODIUM CHLORIDE 0.9 % (FLUSH) 0.9 %
10 SYRINGE (ML) INJECTION EVERY 6 HOURS PRN
Status: DISCONTINUED | OUTPATIENT
Start: 2025-02-24 | End: 2025-02-25 | Stop reason: HOSPADM

## 2025-02-24 RX ORDER — LIDOCAINE HYDROCHLORIDE 20 MG/ML
INJECTION, SOLUTION EPIDURAL; INFILTRATION; INTRACAUDAL; PERINEURAL
Status: DISCONTINUED | OUTPATIENT
Start: 2025-02-24 | End: 2025-02-24

## 2025-02-24 RX ORDER — PROPOFOL 10 MG/ML
VIAL (ML) INTRAVENOUS
Status: DISCONTINUED | OUTPATIENT
Start: 2025-02-24 | End: 2025-02-24

## 2025-02-24 RX ADMIN — PROPOFOL 70 MG: 10 INJECTION, EMULSION INTRAVENOUS at 08:02

## 2025-02-24 RX ADMIN — PROPOFOL 50 MG: 10 INJECTION, EMULSION INTRAVENOUS at 09:02

## 2025-02-24 RX ADMIN — PROPOFOL 50 MG: 10 INJECTION, EMULSION INTRAVENOUS at 08:02

## 2025-02-24 RX ADMIN — PROPOFOL 20 MG: 10 INJECTION, EMULSION INTRAVENOUS at 08:02

## 2025-02-24 RX ADMIN — PROPOFOL 60 MG: 10 INJECTION, EMULSION INTRAVENOUS at 09:02

## 2025-02-24 RX ADMIN — LIDOCAINE HYDROCHLORIDE 100 MG: 20 INJECTION, SOLUTION EPIDURAL; INFILTRATION; INTRACAUDAL; PERINEURAL at 08:02

## 2025-02-24 RX ADMIN — SODIUM CHLORIDE: 9 INJECTION, SOLUTION INTRAVENOUS at 08:02

## 2025-02-24 NOTE — DISCHARGE INSTRUCTIONS
Procedure Date  2/24/25     Impression  Overall Impression:   The terminal ileum appeared normal.  Five polyps measuring from 5 mm up to 12 mm in the cecum; performed cold snare removal  One polyp measuring 10-19 mm in the ascending colon; performed hot snare with piecemeal removal  Subcentimeter polyp in the transverse colon; performed cold snare  Performed forceps biopsies to rule out microscopic colitis  Performed forceps biopsies to rule out microscopic colitis  Scattered diverticulosis in the sigmoid colon  Hemorrhoids     Recommendation  - No endoscopic findings to support abdominal pain, but blood in stool is likely related to internal hemorrhoids  - Repeat colonoscopy in 1 year  - Discharge patient to home  - Advance diet as tolerated  - Continue present medications  - Await pathology results  - Patient has a contact number available for emergencies. The signs and symptoms of potential delayed complications were discussed with the patient. Return to normal activities tomorrow. Written discharge instructions were provided to the patient.   NO DRIVING, OPERATING EQUIPMENT, OR SIGNING LEGAL DOCUMENTS FOR 24 HOURS.

## 2025-02-24 NOTE — ANESTHESIA POSTPROCEDURE EVALUATION
Anesthesia Post Evaluation    Patient: Jazmin Ruiz    Procedure(s) Performed: Colonoscopy     Final Anesthesia Type: MAC      Patient location during evaluation: GI PACU  Patient participation: Yes- Able to Participate  Level of consciousness: awake and alert  Post-procedure vital signs: reviewed and stable  Pain management: adequate  Airway patency: patent  JENNIFER mitigation strategies: Multimodal analgesia  PONV status at discharge: No PONV  Anesthetic complications: no      Cardiovascular status: hemodynamically stable and blood pressure returned to baseline  Respiratory status: spontaneous ventilation and room air  Hydration status: euvolemic  Follow-up not needed.  Comments: Refer to nursing note for pain/chandan score upon discharge              Vitals Value Taken Time   /81 02/24/25 09:58   Temp 36.7 °C (98.1 °F) 02/24/25 09:26   Pulse 65 02/24/25 09:58   Resp 12 02/24/25 09:58   SpO2 98 % 02/24/25 09:58   Vitals shown include unfiled device data.      No case tracking events are documented in the log.      Pain/Chandan Score: Chandan Score: 10 (2/24/2025  9:41 AM)

## 2025-02-24 NOTE — TRANSFER OF CARE
"Anesthesia Transfer of Care Note    Patient: Jazmin Ruiz    Procedure(s) Performed: Colonoscopy     Patient location: GI    Anesthesia Type: MAC    Transport from OR: Transported from OR on room air with adequate spontaneous ventilation. Continuous ECG monitoring in transport. Continuous SpO2 monitoring in transport    Post pain: adequate analgesia    Post assessment: no apparent anesthetic complications    Post vital signs: stable    Level of consciousness: responds to stimulation, awake and sedated    Nausea/Vomiting: no nausea/vomiting    Complications: none    Transfer of care protocol was followed      Last vitals: Visit Vitals  /61   Pulse 72   Temp 36.7 °C (98.1 °F)   Resp 13   Ht 5' 8" (1.727 m)   Wt 100.7 kg (222 lb)   LMP  (LMP Unknown)   SpO2 99%   Breastfeeding No   BMI 33.75 kg/m²     "

## 2025-02-24 NOTE — ANESTHESIA PREPROCEDURE EVALUATION
02/24/2025  Jazmin Ruiz is a 60 y.o., female.      Pre-op Assessment    I have reviewed the Patient Summary Reports.    I have reviewed the NPO Status.   I have reviewed the Medications.     Review of Systems  Anesthesia Hx:             Denies Family Hx of Anesthesia complications.    Denies Personal Hx of Anesthesia complications.                    Social:  Non-Smoker       Hematology/Oncology:  Hematology Normal   Oncology Normal                                   EENT/Dental:  EENT/Dental Normal           Pulmonary:        Sleep Apnea Patient states she is unable to use CPAP at night.           Education provided regarding risk of obstructive sleep apnea            Renal/:  Renal/ Normal                 Hepatic/GI:  Bowel Prep.   GERD                Musculoskeletal:  Musculoskeletal Normal                Neurological:       Seizures          Peripheral Neuropathy                          Endocrine:        Obesity / BMI > 30  Dermatological:  Skin Normal    Psych:  Psychiatric Normal                    Physical Exam  General: Well nourished    Airway:  Mallampati: II   Mouth Opening: Normal  TM Distance: Normal  Tongue: Normal  Neck ROM: Normal ROM    Dental:  Intact        Anesthesia Plan  Type of Anesthesia, risks & benefits discussed:    Anesthesia Type: MAC  Intra-op Monitoring Plan: Standard ASA Monitors  Post Op Pain Control Plan: multimodal analgesia  Induction:  IV  Informed Consent: Informed consent signed with the Patient and all parties understand the risks and agree with anesthesia plan.  All questions answered. Patient consented to blood products? Yes  ASA Score: 3  Day of Surgery Review of History & Physical: H&P Update referred to the surgeon/provider.I have interviewed and examined the patient. I have reviewed the patient's H&P dated: There are no significant changes.     Ready  For Surgery From Anesthesia Perspective.     .    Past Medical History:   Diagnosis Date    HTN (hypertension)     Seizures        Past Surgical History:   Procedure Laterality Date    CARDIAC CATHETERIZATION      HYSTERECTOMY         Family History   Problem Relation Name Age of Onset    Heart disease Mother      Stroke Brother      Hypertension Brother         Social History     Socioeconomic History    Marital status:    Tobacco Use    Smoking status: Never     Passive exposure: Never    Smokeless tobacco: Never   Substance and Sexual Activity    Alcohol use: Yes     Comment: everyday    Drug use: Never    Sexual activity: Not Currently     Partners: Male     Birth control/protection: See Surgical Hx     Social Drivers of Health     Financial Resource Strain: High Risk (1/15/2025)    Overall Financial Resource Strain (CARDIA)     Difficulty of Paying Living Expenses: Hard   Food Insecurity: No Food Insecurity (1/15/2025)    Hunger Vital Sign     Worried About Running Out of Food in the Last Year: Never true     Ran Out of Food in the Last Year: Never true   Physical Activity: Inactive (1/15/2025)    Exercise Vital Sign     Days of Exercise per Week: 0 days     Minutes of Exercise per Session: 0 min   Stress: No Stress Concern Present (1/15/2025)    [employer] - Occupational Stress Questionnaire     Feeling of Stress : Not at all   Housing Stability: Unknown (1/15/2025)    Housing Stability Vital Sign     Unable to Pay for Housing in the Last Year: No       Current Medications[1]    Review of patient's allergies indicates:  No Known Allergies     Outpatient Medications as of 2/24/2025   Medication Sig Dispense Refill    alendronate (FOSAMAX) 70 MG tablet Take 1 tablet (70 mg total) by mouth every 7 days. 4 tablet 0    diphenhydrAMINE (BENADRYL) 25 mg capsule Take 25 mg by mouth every 6 (six) hours as needed for Itching.      diphenhydrAMINE-acetaminophen (TYLENOL PM)  mg  Tab Take 1 tablet by mouth nightly as needed.      estradioL (ESTRACE) 1 MG tablet Take 1 tablet (1 mg total) by mouth once daily. 30 tablet 11    gabapentin (NEURONTIN) 300 MG capsule Take 1 capsule (300 mg total) by mouth 3 (three) times daily. 90 capsule 11    pantoprazole (PROTONIX) 40 MG tablet Take 1 tablet (40 mg total) by mouth once daily. 90 tablet 3     No current facility-administered medications on file as of 2/24/2025.        Chemistry        Component Value Date/Time     10/21/2024 0859    K 4.0 10/21/2024 0859     10/21/2024 0859    CO2 27 10/21/2024 0859    BUN 8 10/21/2024 0859    CREATININE 0.89 10/21/2024 0859     10/21/2024 0859        Component Value Date/Time    CALCIUM 9.0 10/21/2024 0859    ALKPHOS 97 10/21/2024 0859    AST 47 (H) 10/21/2024 0859    ALT 57 (H) 10/21/2024 0859    BILITOT 0.6 10/21/2024 0859        Lab Results   Component Value Date    WBC 4.59 10/21/2024    HGB 14.1 10/21/2024    HCT 43.5 10/21/2024     10/21/2024     No results found for this or any previous visit.          [1]   Current Outpatient Medications   Medication Sig Dispense Refill    alendronate (FOSAMAX) 70 MG tablet Take 1 tablet (70 mg total) by mouth every 7 days. 4 tablet 0    diphenhydrAMINE (BENADRYL) 25 mg capsule Take 25 mg by mouth every 6 (six) hours as needed for Itching.      diphenhydrAMINE-acetaminophen (TYLENOL PM)  mg Tab Take 1 tablet by mouth nightly as needed.      estradioL (ESTRACE) 1 MG tablet Take 1 tablet (1 mg total) by mouth once daily. 30 tablet 11    gabapentin (NEURONTIN) 300 MG capsule Take 1 capsule (300 mg total) by mouth 3 (three) times daily. 90 capsule 11    pantoprazole (PROTONIX) 40 MG tablet Take 1 tablet (40 mg total) by mouth once daily. 90 tablet 3     No current facility-administered medications for this encounter.

## 2025-02-24 NOTE — H&P
History & Physical - Short Stay  Gastroenterology      SUBJECTIVE:     Procedure: Colonoscopy    Chief Complaint/Indication for Procedure: Abdominal Pain, Change in Bowel Habits, and Rectal Bleeding    (Not in a hospital admission)      Review of patient's allergies indicates:  No Known Allergies     Past Medical History:   Diagnosis Date    HTN (hypertension)     Seizures      Past Surgical History:   Procedure Laterality Date    CARDIAC CATHETERIZATION      HYSTERECTOMY       Family History   Problem Relation Name Age of Onset    Heart disease Mother      Stroke Brother      Hypertension Brother       Social History[1]      OBJECTIVE:     Vital Signs (Most Recent)  Temp: 97.9 °F (36.6 °C) (02/24/25 0824)  Pulse: 76 (02/24/25 0824)  Resp: 17 (02/24/25 0824)  BP: (!) 128/53 (02/24/25 0824)  SpO2: 97 % (02/24/25 0824)    Physical Exam:                                                       General appearance: alert, cooperative, no distress, comfortable appearing  HENT: Normocephalic, atraumatic, neck symmetrical  Eyes: conjunctivae clear  Lungs: No labored breathing  Abd: Soft, non-tender    ASSESSMENT/PLAN:     Assessment: Abdominal Pain, Change in Bowel Habits, and Rectal Bleeding    Plan: Colonoscopy         [1]   Social History  Tobacco Use    Smoking status: Never     Passive exposure: Never    Smokeless tobacco: Never   Substance Use Topics    Alcohol use: Yes     Comment: everyday    Drug use: Never

## 2025-02-25 ENCOUNTER — TELEPHONE (OUTPATIENT)
Dept: GASTROENTEROLOGY | Facility: CLINIC | Age: 60
End: 2025-02-25

## 2025-02-25 LAB
ESTROGEN SERPL-MCNC: NORMAL PG/ML
INSULIN SERPL-ACNC: NORMAL U[IU]/ML
LAB AP GROSS DESCRIPTION: NORMAL
LAB AP LABORATORY NOTES: NORMAL
T3RU NFR SERPL: NORMAL %

## 2025-02-25 NOTE — TELEPHONE ENCOUNTER
----- Message from Alexa sent at 2/25/2025 11:34 AM CST -----  Regarding: talk to the nurse  Who Called: Jazmin Orantes is requesting assistance/information from provider's office.Symptoms (please be specific): Patient had the procedure done yesterday and would like to know the results of the procedure and would like call back Preferred Method of Contact: Phone CallPatient's Preferred Phone Number on File: [phone] Best Call Back Number, if different:Additional Information:

## 2025-02-25 NOTE — TELEPHONE ENCOUNTER
----- Message from Terri sent at 2/25/2025  4:43 PM CST -----  Regarding: Results of the procedure she had done yesterday  Who Called: Jazmin RuizPatient is returning phone callWho Left Message for Patient: UnsureDoes the patient know what this is regarding?: Results of procedure she had done yesterdayPreferred Method of Contact: Phone CallPatient's Preferred Phone Number on File: [phone] Best Call Back Number, if different:Additional Information:

## 2025-02-25 NOTE — TELEPHONE ENCOUNTER
Spoke w/ pt earlier w/ understanding results would be reviewed once pathology received/reviewed by provider w/ good vu noted

## 2025-02-25 NOTE — TELEPHONE ENCOUNTER
Call returned to pt - pt was requesting results from Colonoscopy - pt advised that once path is received and reviewed by physician results will be called and that I would let the provider know of her inquiry - good vu noted

## 2025-02-28 ENCOUNTER — RESULTS FOLLOW-UP (OUTPATIENT)
Dept: GASTROENTEROLOGY | Facility: HOSPITAL | Age: 60
End: 2025-02-28

## 2025-02-28 NOTE — TELEPHONE ENCOUNTER
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 Repeat colonoscopy   recommended in 1 year. Place reminder in system for repeat colonoscopy.  ----- Message -----  From: Lab, Background User  Sent: 2/25/2025   9:12 AM CST  To: Ubaldo Antonio MD

## 2025-02-28 NOTE — TELEPHONE ENCOUNTER
Spoke w/ pt and reviewed results - pt w/ understanding but requested for provider to call and review with her personally - advised she was out of clinic on Fridays but I would let the provider know to f/u with her on Mon

## 2025-03-19 ENCOUNTER — PATIENT MESSAGE (OUTPATIENT)
Dept: OBSTETRICS AND GYNECOLOGY | Facility: CLINIC | Age: 60
End: 2025-03-19

## 2025-03-19 DIAGNOSIS — M81.6 LOCALIZED OSTEOPOROSIS WITHOUT CURRENT PATHOLOGICAL FRACTURE: ICD-10-CM

## 2025-03-19 DIAGNOSIS — M81.6 LOCALIZED OSTEOPOROSIS WITHOUT CURRENT PATHOLOGICAL FRACTURE: Primary | ICD-10-CM

## 2025-03-19 RX ORDER — ALENDRONATE SODIUM 70 MG/1
70 TABLET ORAL
Qty: 4 TABLET | Refills: 5 | Status: SHIPPED | OUTPATIENT
Start: 2025-03-19

## 2025-03-19 NOTE — TELEPHONE ENCOUNTER
I will refill it for another 6 months. She needs to repeat the DEXA prior to 11/2025. That was the recommendation on the last scan to see if the medication is working. (You are not supposed to continue the medication indefinitely.) And have her make an annual appointment at some point prior to November.

## 2025-04-22 ENCOUNTER — OFFICE VISIT (OUTPATIENT)
Dept: FAMILY MEDICINE | Facility: CLINIC | Age: 60
End: 2025-04-22

## 2025-04-22 ENCOUNTER — HOSPITAL ENCOUNTER (OUTPATIENT)
Dept: RADIOLOGY | Facility: HOSPITAL | Age: 60
Discharge: HOME | End: 2025-04-22
Attending: RADIOLOGY

## 2025-04-22 ENCOUNTER — HOSPITAL ENCOUNTER (OUTPATIENT)
Dept: RADIOLOGY | Facility: HOSPITAL | Age: 60
Discharge: HOME | End: 2025-04-22
Attending: OBSTETRICS & GYNECOLOGY

## 2025-04-22 VITALS
HEIGHT: 68 IN | BODY MASS INDEX: 31.83 KG/M2 | DIASTOLIC BLOOD PRESSURE: 90 MMHG | SYSTOLIC BLOOD PRESSURE: 138 MMHG | HEART RATE: 81 BPM | TEMPERATURE: 98 F | WEIGHT: 210 LBS | OXYGEN SATURATION: 99 %

## 2025-04-22 DIAGNOSIS — Z12.31 VISIT FOR SCREENING MAMMOGRAM: ICD-10-CM

## 2025-04-22 DIAGNOSIS — R68.81 EARLY SATIETY: ICD-10-CM

## 2025-04-22 DIAGNOSIS — M81.6 LOCALIZED OSTEOPOROSIS WITHOUT CURRENT PATHOLOGICAL FRACTURE: ICD-10-CM

## 2025-04-22 DIAGNOSIS — R53.83 FATIGUE, UNSPECIFIED TYPE: ICD-10-CM

## 2025-04-22 DIAGNOSIS — G47.33 OBSTRUCTIVE SLEEP APNEA: ICD-10-CM

## 2025-04-22 DIAGNOSIS — R92.8 ABNORMAL MAMMOGRAM: ICD-10-CM

## 2025-04-22 DIAGNOSIS — M25.50 POLYARTHRALGIA: ICD-10-CM

## 2025-04-22 DIAGNOSIS — Z76.89 ENCOUNTER TO ESTABLISH CARE WITH NEW DOCTOR: Primary | ICD-10-CM

## 2025-04-22 LAB
CRP SERPL HS-MCNC: 6.19 MG/L
ERYTHROCYTE [SEDIMENTATION RATE] IN BLOOD BY WESTERGREN METHOD: 48 MM/HR (ref 0–30)
RHEUMATOID FACT SER NEPH-ACNC: NEGATIVE [IU]/ML
T4 FREE SERPL-MCNC: 1.03 NG/DL (ref 0.7–1.48)
TSH SERPL DL<=0.005 MIU/L-ACNC: 1.06 UIU/ML (ref 0.35–4.94)

## 2025-04-22 PROCEDURE — 76641 ULTRASOUND BREAST COMPLETE: CPT | Mod: 26,50,, | Performed by: RADIOLOGY

## 2025-04-22 PROCEDURE — 77080 DXA BONE DENSITY AXIAL: CPT | Mod: TC

## 2025-04-22 PROCEDURE — 84443 ASSAY THYROID STIM HORMONE: CPT | Mod: ,,, | Performed by: CLINICAL MEDICAL LABORATORY

## 2025-04-22 PROCEDURE — 86235 NUCLEAR ANTIGEN ANTIBODY: CPT | Mod: ,,, | Performed by: CLINICAL MEDICAL LABORATORY

## 2025-04-22 PROCEDURE — 86225 DNA ANTIBODY NATIVE: CPT | Mod: ,,, | Performed by: CLINICAL MEDICAL LABORATORY

## 2025-04-22 PROCEDURE — 84439 ASSAY OF FREE THYROXINE: CPT | Mod: ,,, | Performed by: CLINICAL MEDICAL LABORATORY

## 2025-04-22 PROCEDURE — 86141 C-REACTIVE PROTEIN HS: CPT | Mod: ,,, | Performed by: CLINICAL MEDICAL LABORATORY

## 2025-04-22 PROCEDURE — 77063 BREAST TOMOSYNTHESIS BI: CPT | Mod: TC

## 2025-04-22 PROCEDURE — 77067 SCR MAMMO BI INCL CAD: CPT | Mod: 26,,, | Performed by: RADIOLOGY

## 2025-04-22 PROCEDURE — 77061 BREAST TOMOSYNTHESIS UNI: CPT | Mod: TC,LT

## 2025-04-22 PROCEDURE — 77080 DXA BONE DENSITY AXIAL: CPT | Mod: 26,,, | Performed by: NUCLEAR MEDICINE

## 2025-04-22 PROCEDURE — 77063 BREAST TOMOSYNTHESIS BI: CPT | Mod: 26,,, | Performed by: RADIOLOGY

## 2025-04-22 PROCEDURE — 86430 RHEUMATOID FACTOR TEST QUAL: CPT | Mod: ,,, | Performed by: CLINICAL MEDICAL LABORATORY

## 2025-04-22 PROCEDURE — 86038 ANTINUCLEAR ANTIBODIES: CPT | Mod: ,,, | Performed by: CLINICAL MEDICAL LABORATORY

## 2025-04-22 PROCEDURE — 76641 ULTRASOUND BREAST COMPLETE: CPT | Mod: TC,50

## 2025-04-22 PROCEDURE — 77065 DX MAMMO INCL CAD UNI: CPT | Mod: 26,LT,, | Performed by: RADIOLOGY

## 2025-04-22 PROCEDURE — 77061 BREAST TOMOSYNTHESIS UNI: CPT | Mod: 26,LT,, | Performed by: RADIOLOGY

## 2025-04-22 PROCEDURE — 99214 OFFICE O/P EST MOD 30 MIN: CPT | Mod: ,,, | Performed by: INTERNAL MEDICINE

## 2025-04-22 PROCEDURE — 85651 RBC SED RATE NONAUTOMATED: CPT | Mod: ,,, | Performed by: CLINICAL MEDICAL LABORATORY

## 2025-04-22 NOTE — PROGRESS NOTES
"Subjective     Patient ID: Jazmin Ruiz is a 60 y.o. female.    Chief Complaint: Establish Care, Health Maintenance (Hepatitis C Screening Never done/HIV Screening Never done/TETANUS VACCINE Never done/Shingles Vaccine(1 of 2) Never done/Pneumococcal Vaccines (Age 50+)(1 of 1 - PCV) Never done/Influenza Vaccine(1) Never done/COVID-19 Vaccine(1 - 2024-25 season) Never done/Mammogram due on 12/01/2024/), and Fatigue    Mrs. Ruiz is a 60-year-old female the presents today to establish care.  Currently just takes medications for chronic back pain, GERD, and osteoporosis.  She has a magnitude of complaints which include polyarthralgias, myalgias, and crippling fatigue.  She states that she has had this for as long as she can remember but that recently it seems to be worse.  She states that they are days when she feels like she can not get out of the bed.  She states that she "has inflammation".  She is convinced that she has chronic granulomatous disease.  I have tried to explain to her this condition is typically diagnosed before the age of 5 in the [facility] are repeated infections of that are both bacterial and fungal with granuloma formation.  She also complains of early satiety and weight loss.  She has had an EGD and a colonoscopy done within the last year.  She did have some polyps and they sent biopsies for microscopic colitis.  They did recommend repeat colonoscopy in 1 year.  Blood pressure today is borderline at 130 8/90.  Otherwise vital signs are stable.  She does also have a history of sleep apnea but does not use a CPAP.  She is resting comfortably today in no distress.    Fatigue  Associated symptoms include arthralgias, fatigue and myalgias. Pertinent negatives include no abdominal pain, chest pain, chills, congestion, coughing, fever, headaches, joint swelling, nausea, neck pain, rash, sore throat or weakness.     Review of Systems   Constitutional:  Positive for fatigue. Negative for appetite " change, chills and fever.   HENT:  Negative for nasal congestion, ear pain, hearing loss, sinus pressure/congestion and sore throat.    Eyes:  Negative for pain, redness and visual disturbance.   Respiratory:  Negative for apnea, cough, shortness of breath and wheezing.    Cardiovascular:  Negative for chest pain and palpitations.   Gastrointestinal:  Negative for abdominal pain, constipation, diarrhea and nausea.   Endocrine: Negative for cold intolerance, heat intolerance and polyuria.   Genitourinary:  Negative for dysuria and hematuria.   Musculoskeletal:  Positive for arthralgias and myalgias. Negative for back pain, joint swelling and neck pain.   Integumentary:  Negative for pallor, rash and wound.   Allergic/Immunologic: Negative for immunocompromised state.   Neurological:  Negative for tremors, seizures, weakness, headaches and memory loss.   Hematological:  Negative for adenopathy.   Psychiatric/Behavioral:  Positive for sleep disturbance. Negative for confusion and dysphoric mood. The patient is not nervous/anxious.           Objective     Physical Exam  Vitals and nursing note reviewed.   Constitutional:       General: She is not in acute distress.     Appearance: Normal appearance. She is obese. She is not ill-appearing.   HENT:      Head: Normocephalic and atraumatic.      Right Ear: External ear normal.      Left Ear: External ear normal.      Nose: Nose normal.      Mouth/Throat:      Pharynx: Oropharynx is clear.   Eyes:      Extraocular Movements: Extraocular movements intact.      Conjunctiva/sclera: Conjunctivae normal.      Pupils: Pupils are equal, round, and reactive to light.   Neck:      Vascular: No carotid bruit.   Cardiovascular:      Rate and Rhythm: Normal rate and regular rhythm.      Pulses: Normal pulses.      Heart sounds: Normal heart sounds. No murmur heard.  Pulmonary:      Effort: No respiratory distress.      Breath sounds: Normal breath sounds. No wheezing or rales.    Abdominal:      General: Bowel sounds are normal.      Palpations: Abdomen is soft.   Musculoskeletal:         General: Normal range of motion.      Cervical back: Normal range of motion and neck supple.      Right lower leg: No edema.      Left lower leg: No edema.   Skin:     General: Skin is warm and dry.      Capillary Refill: Capillary refill takes less than 2 seconds.      Coloration: Skin is not pale.   Neurological:      General: No focal deficit present.      Mental Status: She is alert and oriented to person, place, and time.      Cranial Nerves: No cranial nerve deficit.      Sensory: No sensory deficit.      Motor: No weakness.      Gait: Gait normal.   Psychiatric:         Mood and Affect: Mood normal.         Judgment: Judgment normal.            Assessment and Plan     1. Encounter to establish care with new doctor    2. Polyarthralgia  -     CRP, High Sensitivity; Future; Expected date: 04/22/2025  -     Sedimentation Rate; Future; Expected date: 04/22/2025  -     TSH; Future; Expected date: 04/22/2025  -     T4, Free; Future; Expected date: 04/22/2025  -     BRUNA EIA w/ Reflex to dsDNA/KATLIN; Future; Expected date: 04/22/2025  -     Rheumatoid Factor Screen; Future; Expected date: 04/22/2025    3. Fatigue, unspecified type    4. Early satiety        1. Patient presents today to establish care.  She has a acute issues today so we have not been able to address care gaps but we will at her next visit    2. Polyarthralgias/myalgias-I really think this is due to fibromyalgia.  However I know this has a diagnosis of exclusion.  I am going to check inflammatory markers, BRUNA, and rheumatoid factor.    3. Fatigue-likely multifactorial.  She may have chronic fatigue syndrome.  However she does not sleep great so that is likely contributing.  I am going to check thyroid studies as well.    4.-early satiety-probably in part related to reflux.  I have encouraged her to continue with the Protonix.  She has had  both colonoscopy and EGD done within the last year.    5. Obstructive sleep apnea-she does not use a CPAP.  She may be a candidate for the inspire      Billing based on 45 minutes of time spent on this encounter       Follow up in about 4 weeks (around 5/20/2025).

## 2025-04-23 ENCOUNTER — RESULTS FOLLOW-UP (OUTPATIENT)
Dept: OBSTETRICS AND GYNECOLOGY | Facility: CLINIC | Age: 60
End: 2025-04-23

## 2025-04-23 ENCOUNTER — PATIENT MESSAGE (OUTPATIENT)
Dept: OBSTETRICS AND GYNECOLOGY | Facility: CLINIC | Age: 60
End: 2025-04-23

## 2025-04-24 LAB
ANA SER QL: POSITIVE
DSDNA IGG SERPL IA-ACNC: 4 IU (ref 0–24)
DSDNA IGG SERPL IA-ACNC: NEGATIVE [IU]/ML
ENA AB SER QL IA: 4.4 EUS (ref 0–19.9)
ENA AB SER QL IA: NEGATIVE

## 2025-04-25 ENCOUNTER — PATIENT MESSAGE (OUTPATIENT)
Dept: FAMILY MEDICINE | Facility: CLINIC | Age: 60
End: 2025-04-25

## 2025-04-25 ENCOUNTER — RESULTS FOLLOW-UP (OUTPATIENT)
Dept: INTERNAL MEDICINE | Facility: CLINIC | Age: 60
End: 2025-04-25

## 2025-04-28 ENCOUNTER — TELEPHONE (OUTPATIENT)
Dept: FAMILY MEDICINE | Facility: CLINIC | Age: 60
End: 2025-04-28

## 2025-04-28 DIAGNOSIS — M25.50 POLYARTHRALGIA: ICD-10-CM

## 2025-04-28 DIAGNOSIS — R76.8 POSITIVE ANA (ANTINUCLEAR ANTIBODY): Primary | ICD-10-CM

## 2025-04-28 NOTE — TELEPHONE ENCOUNTER
----- Message from Leandra sent at 4/28/2025  3:57 PM CDT -----  Regarding: RE: calling back  Pt was sent to patient advocacy due to threats  ----- Message -----  From: Leilani Hubbard LPN  Sent: 4/28/2025   2:48 PM CDT  To: Leandra Jacobo  Subject: FW: calling back                                   ----- Message -----  From: Stephany Benítez  Sent: 4/28/2025   2:18 PM CDT  To: [facility] Staff  Subject: calling back                                     Who Called: Jazmin RuizPatient is returning phone callWho Left Message for Patient:Gertrude Does the patient know what this is regarding?:said she had a missed call from Gertrude no encounter in chart Preferred Method of Contact: Phone CallPatient's Preferred Phone Number on File: [phone] Best Call Back Number, if different:Additional Information:

## 2025-08-18 ENCOUNTER — HOSPITAL ENCOUNTER (EMERGENCY)
Facility: HOSPITAL | Age: 60
Discharge: HOME | End: 2025-08-19
Payer: COMMERCIAL

## 2025-08-18 DIAGNOSIS — Z72.820 SLEEP DEPRIVATION: ICD-10-CM

## 2025-08-18 DIAGNOSIS — K62.5 RECTAL BLEEDING: ICD-10-CM

## 2025-08-18 DIAGNOSIS — F23 ACUTE PSYCHOSIS: Primary | ICD-10-CM

## 2025-08-18 LAB
ALBUMIN SERPL BCP-MCNC: 3.6 G/DL (ref 3.4–4.8)
ALBUMIN/GLOB SERPL: 0.8 {RATIO}
ALP SERPL-CCNC: 87 U/L (ref 40–150)
ALT SERPL W P-5'-P-CCNC: 17 U/L
AMPHET UR QL SCN: NEGATIVE
ANION GAP SERPL CALCULATED.3IONS-SCNC: 14 MMOL/L (ref 7–16)
APAP SERPL-MCNC: <3 ΜG/ML (ref 10–30)
AST SERPL W P-5'-P-CCNC: 32 U/L (ref 11–45)
BARBITURATES UR QL SCN: NEGATIVE
BASOPHILS # BLD AUTO: 0.04 K/UL (ref 0–0.2)
BASOPHILS NFR BLD AUTO: 0.5 % (ref 0–1)
BENZODIAZ METAB UR QL SCN: POSITIVE
BILIRUB SERPL-MCNC: 0.6 MG/DL
BILIRUB UR QL STRIP: NEGATIVE
BUN SERPL-MCNC: 11 MG/DL (ref 10–20)
BUN/CREAT SERPL: 13 (ref 6–20)
CALCIUM SERPL-MCNC: 9.7 MG/DL (ref 8.4–10.2)
CANNABINOIDS UR QL SCN: POSITIVE
CHLORIDE SERPL-SCNC: 106 MMOL/L (ref 98–107)
CLARITY UR: CLEAR
CO2 SERPL-SCNC: 22 MMOL/L (ref 23–31)
COCAINE UR QL SCN: NEGATIVE
COLOR UR: ABNORMAL
CREAT SERPL-MCNC: 0.84 MG/DL (ref 0.55–1.02)
DIFFERENTIAL METHOD BLD: ABNORMAL
EGFR (NO RACE VARIABLE) (RUSH/TITUS): 80 ML/MIN/1.73M2
EOSINOPHIL # BLD AUTO: 0.01 K/UL (ref 0–0.5)
EOSINOPHIL NFR BLD AUTO: 0.1 % (ref 1–4)
ERYTHROCYTE [DISTWIDTH] IN BLOOD BY AUTOMATED COUNT: 12.8 % (ref 11.5–14.5)
ETHANOL SERPL-MCNC: <10 MG/DL
GLOBULIN SER-MCNC: 4.7 G/DL (ref 2–4)
GLUCOSE SERPL-MCNC: 105 MG/DL (ref 82–115)
GLUCOSE UR STRIP-MCNC: NORMAL MG/DL
HCT VFR BLD AUTO: 41.2 % (ref 38–47)
HGB BLD-MCNC: 13.5 G/DL (ref 12–16)
IMM GRANULOCYTES # BLD AUTO: 0.04 K/UL (ref 0–0.04)
IMM GRANULOCYTES NFR BLD: 0.5 % (ref 0–0.4)
KETONES UR STRIP-SCNC: 150 MG/DL
LEUKOCYTE ESTERASE UR QL STRIP: NEGATIVE
LYMPHOCYTES # BLD AUTO: 0.77 K/UL (ref 1–4.8)
LYMPHOCYTES NFR BLD AUTO: 9.3 % (ref 27–41)
MCH RBC QN AUTO: 27.8 PG (ref 27–31)
MCHC RBC AUTO-ENTMCNC: 32.8 G/DL (ref 32–36)
MCV RBC AUTO: 84.9 FL (ref 80–96)
MONOCYTES # BLD AUTO: 0.46 K/UL (ref 0–0.8)
MONOCYTES NFR BLD AUTO: 5.6 % (ref 2–6)
MPC BLD CALC-MCNC: 9.7 FL (ref 9.4–12.4)
NEUTROPHILS # BLD AUTO: 6.93 K/UL (ref 1.8–7.7)
NEUTROPHILS NFR BLD AUTO: 84 % (ref 53–65)
NITRITE UR QL STRIP: NEGATIVE
NRBC # BLD AUTO: 0 X10E3/UL
NRBC, AUTO (.00): 0 %
OPIATES UR QL SCN: NEGATIVE
PCP UR QL SCN: NEGATIVE
PH UR STRIP: 5 PH UNITS
PLATELET # BLD AUTO: 220 K/UL (ref 150–400)
POTASSIUM SERPL-SCNC: 4.4 MMOL/L (ref 3.5–5.1)
PROT SERPL-MCNC: 8.3 G/DL (ref 5.8–7.6)
PROT UR QL STRIP: NEGATIVE
RBC # BLD AUTO: 4.85 M/UL (ref 4.2–5.4)
RBC # UR STRIP: NEGATIVE /UL
SALICYLATES SERPL-MCNC: <5 MG/DL (ref 15–30)
SODIUM SERPL-SCNC: 138 MMOL/L (ref 136–145)
SP GR UR STRIP: 1.02
UROBILINOGEN UR STRIP-ACNC: NORMAL MG/DL
WBC # BLD AUTO: 8.25 K/UL (ref 4.5–11)

## 2025-08-18 PROCEDURE — 80307 DRUG TEST PRSMV CHEM ANLYZR: CPT | Performed by: FAMILY MEDICINE

## 2025-08-18 PROCEDURE — 80053 COMPREHEN METABOLIC PANEL: CPT | Performed by: FAMILY MEDICINE

## 2025-08-18 PROCEDURE — 63600175 PHARM REV CODE 636 W HCPCS: Performed by: FAMILY MEDICINE

## 2025-08-18 PROCEDURE — 36415 COLL VENOUS BLD VENIPUNCTURE: CPT | Performed by: FAMILY MEDICINE

## 2025-08-18 PROCEDURE — 80179 DRUG ASSAY SALICYLATE: CPT | Performed by: FAMILY MEDICINE

## 2025-08-18 PROCEDURE — 81003 URINALYSIS AUTO W/O SCOPE: CPT | Mod: 59 | Performed by: FAMILY MEDICINE

## 2025-08-18 PROCEDURE — 63600175 PHARM REV CODE 636 W HCPCS

## 2025-08-18 PROCEDURE — 96372 THER/PROPH/DIAG INJ SC/IM: CPT

## 2025-08-18 PROCEDURE — 82077 ASSAY SPEC XCP UR&BREATH IA: CPT | Performed by: FAMILY MEDICINE

## 2025-08-18 PROCEDURE — 99284 EMERGENCY DEPT VISIT MOD MDM: CPT | Mod: 25

## 2025-08-18 PROCEDURE — 85025 COMPLETE CBC W/AUTO DIFF WBC: CPT | Performed by: FAMILY MEDICINE

## 2025-08-18 PROCEDURE — 96372 THER/PROPH/DIAG INJ SC/IM: CPT | Performed by: FAMILY MEDICINE

## 2025-08-18 PROCEDURE — 80143 DRUG ASSAY ACETAMINOPHEN: CPT | Performed by: FAMILY MEDICINE

## 2025-08-18 RX ORDER — LORAZEPAM 2 MG/ML
2 INJECTION INTRAMUSCULAR
Status: COMPLETED | OUTPATIENT
Start: 2025-08-18 | End: 2025-08-18

## 2025-08-18 RX ORDER — LORAZEPAM 2 MG/ML
2 INJECTION INTRAMUSCULAR
Status: DISCONTINUED | OUTPATIENT
Start: 2025-08-18 | End: 2025-08-18

## 2025-08-18 RX ORDER — MIDAZOLAM HYDROCHLORIDE 2 MG/2ML
4 INJECTION, SOLUTION INTRAMUSCULAR; INTRAVENOUS
Status: COMPLETED | OUTPATIENT
Start: 2025-08-18 | End: 2025-08-18

## 2025-08-18 RX ORDER — HALOPERIDOL LACTATE 5 MG/ML
10 INJECTION, SOLUTION INTRAMUSCULAR
Status: COMPLETED | OUTPATIENT
Start: 2025-08-18 | End: 2025-08-18

## 2025-08-18 RX ADMIN — LORAZEPAM 2 MG: 2 INJECTION INTRAMUSCULAR; INTRAVENOUS at 07:08

## 2025-08-18 RX ADMIN — HALOPERIDOL LACTATE 10 MG: 5 INJECTION, SOLUTION INTRAMUSCULAR at 05:08

## 2025-08-18 RX ADMIN — MIDAZOLAM HYDROCHLORIDE 4 MG: 1 INJECTION, SOLUTION INTRAMUSCULAR; INTRAVENOUS at 05:08

## 2025-08-19 VITALS
OXYGEN SATURATION: 100 % | WEIGHT: 200 LBS | BODY MASS INDEX: 30.31 KG/M2 | DIASTOLIC BLOOD PRESSURE: 78 MMHG | TEMPERATURE: 98 F | RESPIRATION RATE: 20 BRPM | HEIGHT: 68 IN | HEART RATE: 92 BPM | SYSTOLIC BLOOD PRESSURE: 139 MMHG

## 2025-08-19 RX ORDER — TRAZODONE HYDROCHLORIDE 100 MG/1
100 TABLET ORAL NIGHTLY
Qty: 30 TABLET | Refills: 11 | Status: SHIPPED | OUTPATIENT
Start: 2025-08-19 | End: 2026-08-19

## 2025-08-19 RX ORDER — BUSPIRONE HYDROCHLORIDE 10 MG/1
10 TABLET ORAL 3 TIMES DAILY
Qty: 90 TABLET | Refills: 11 | Status: SHIPPED | OUTPATIENT
Start: 2025-08-19 | End: 2026-08-19